# Patient Record
Sex: MALE | Race: WHITE | Employment: OTHER | URBAN - METROPOLITAN AREA
[De-identification: names, ages, dates, MRNs, and addresses within clinical notes are randomized per-mention and may not be internally consistent; named-entity substitution may affect disease eponyms.]

---

## 2017-02-07 ENCOUNTER — HOSPITAL ENCOUNTER (OUTPATIENT)
Dept: LAB | Age: 77
Discharge: HOME | End: 2017-02-07

## 2017-12-01 ENCOUNTER — HOSPITAL ENCOUNTER (OUTPATIENT)
Dept: LAB | Age: 77
Discharge: HOME | End: 2017-12-01

## 2018-12-03 ENCOUNTER — HOSPITAL ENCOUNTER (OUTPATIENT)
Dept: LAB | Age: 78
Discharge: HOME | End: 2018-12-03

## 2020-02-21 ENCOUNTER — HOSPITAL ENCOUNTER (OUTPATIENT)
Dept: GENERAL RADIOLOGY | Age: 80
Discharge: HOME | End: 2020-02-21
Attending: ORTHOPAEDIC SURGERY
Payer: MEDICARE

## 2020-02-21 ENCOUNTER — HOSPITAL ENCOUNTER (OUTPATIENT)
Dept: MRI IMAGING | Age: 80
Discharge: HOME | End: 2020-02-21
Attending: ORTHOPAEDIC SURGERY
Payer: MEDICARE

## 2020-02-21 DIAGNOSIS — M54.16 LUMBAR RADICULOPATHY: ICD-10-CM

## 2020-02-21 PROCEDURE — 70030 X-RAY EYE FOR FOREIGN BODY: CPT

## 2020-02-21 PROCEDURE — 72148 MRI LUMBAR SPINE W/O DYE: CPT

## 2024-12-31 ENCOUNTER — TELEPHONE (OUTPATIENT)
Age: 84
End: 2024-12-31

## 2024-12-31 DIAGNOSIS — R94.39 ABNORMAL STRESS TEST: Primary | ICD-10-CM

## 2024-12-31 DIAGNOSIS — I25.10 ATHEROSCLEROSIS OF NATIVE CORONARY ARTERY, UNSPECIFIED WHETHER ANGINA PRESENT, UNSPECIFIED WHETHER NATIVE OR TRANSPLANTED HEART: ICD-10-CM

## 2024-12-31 NOTE — TELEPHONE ENCOUNTER
Spoke with Pts spouse regarding [facility] schd. For 1/21/2025 At 1:00 PM arrive at 11:30 AM. Pt aware that they need a  they must stay on site NPO from Midnight the night before. You can have clear liquids up to 2 hours prior to procedure. Please drink 10 8oz glasses of water 3 days prior and 3 days after Cath. Check in at the second floor Outpt. Reg. Desk. [facility] 1[facility]14/[facility].     Medications:  Okay to take as normal     VO by /nurse: Cande

## 2025-01-21 ENCOUNTER — APPOINTMENT (OUTPATIENT)
Facility: HOSPITAL | Age: 85
DRG: 323 | End: 2025-01-21
Attending: INTERNAL MEDICINE
Payer: MEDICARE

## 2025-01-21 ENCOUNTER — HOSPITAL ENCOUNTER (INPATIENT)
Facility: HOSPITAL | Age: 85
LOS: 2 days | Discharge: HOME HEALTH | DRG: 323 | End: 2025-01-24
Attending: INTERNAL MEDICINE | Admitting: INTERNAL MEDICINE
Payer: MEDICARE

## 2025-01-21 DIAGNOSIS — R94.39 ABNORMAL STRESS TEST: ICD-10-CM

## 2025-01-21 DIAGNOSIS — I21.4 NSTEMI (NON-ST ELEVATED MYOCARDIAL INFARCTION) (HCC): ICD-10-CM

## 2025-01-21 DIAGNOSIS — I25.10 CAD (CORONARY ARTERY DISEASE): Primary | ICD-10-CM

## 2025-01-21 DIAGNOSIS — I72.9 PSEUDOANEURYSM (HCC): ICD-10-CM

## 2025-01-21 DIAGNOSIS — I25.10 CORONARY ARTERY DISEASE: ICD-10-CM

## 2025-01-21 LAB
ACT BLD: 239 SECS (ref 79–138)
ACT BLD: 273 SECS (ref 79–138)
ACT BLD: 308 SECS (ref 79–138)
ACT BLD: 314 SECS (ref 79–138)
BASOPHILS # BLD: 0.04 K/UL (ref 0–0.1)
BASOPHILS NFR BLD: 0.2 % (ref 0–1)
DIFFERENTIAL METHOD BLD: ABNORMAL
ECHO BSA: 2.35 M2
ECHO BSA: 2.35 M2
ECHO LV EF PHYSICIAN: 50 %
EKG ATRIAL RATE: 48 BPM
EKG DIAGNOSIS: NORMAL
EKG Q-T INTERVAL: 442 MS
EKG QRS DURATION: 144 MS
EKG QTC CALCULATION (BAZETT): 394 MS
EKG R AXIS: -74 DEGREES
EKG T AXIS: 26 DEGREES
EKG VENTRICULAR RATE: 48 BPM
EOSINOPHIL # BLD: 0.01 K/UL (ref 0–0.4)
EOSINOPHIL NFR BLD: 0.1 % (ref 0–7)
ERYTHROCYTE [DISTWIDTH] IN BLOOD BY AUTOMATED COUNT: 12.6 % (ref 11.5–14.5)
HCT VFR BLD AUTO: 37.2 % (ref 36.6–50.3)
HGB BLD-MCNC: 12.6 G/DL (ref 12.1–17)
IMM GRANULOCYTES # BLD AUTO: 0.05 K/UL (ref 0–0.04)
IMM GRANULOCYTES NFR BLD AUTO: 0.3 % (ref 0–0.5)
LYMPHOCYTES # BLD: 1.17 K/UL (ref 0.8–3.5)
LYMPHOCYTES NFR BLD: 7 % (ref 12–49)
MCH RBC QN AUTO: 34.4 PG (ref 26–34)
MCHC RBC AUTO-ENTMCNC: 33.9 G/DL (ref 30–36.5)
MCV RBC AUTO: 101.6 FL (ref 80–99)
MONOCYTES # BLD: 1.01 K/UL (ref 0–1)
MONOCYTES NFR BLD: 6.1 % (ref 5–13)
NEUTS SEG # BLD: 14.36 K/UL (ref 1.8–8)
NEUTS SEG NFR BLD: 86.3 % (ref 32–75)
NRBC # BLD: 0 K/UL (ref 0–0.01)
NRBC BLD-RTO: 0 PER 100 WBC
PLATELET # BLD AUTO: 366 K/UL (ref 150–400)
PMV BLD AUTO: 9.7 FL (ref 8.9–12.9)
RBC # BLD AUTO: 3.66 M/UL (ref 4.1–5.7)
WBC # BLD AUTO: 16.6 K/UL (ref 4.1–11.1)

## 2025-01-21 PROCEDURE — 02F03ZZ FRAGMENTATION IN CORONARY ARTERY, ONE ARTERY, PERCUTANEOUS APPROACH: ICD-10-PCS | Performed by: INTERNAL MEDICINE

## 2025-01-21 PROCEDURE — 6360000004 HC RX CONTRAST MEDICATION: Performed by: INTERNAL MEDICINE

## 2025-01-21 PROCEDURE — 2580000003 HC RX 258: Performed by: INTERNAL MEDICINE

## 2025-01-21 PROCEDURE — 93308 TTE F-UP OR LMTD: CPT

## 2025-01-21 PROCEDURE — G0378 HOSPITAL OBSERVATION PER HR: HCPCS

## 2025-01-21 PROCEDURE — 6370000000 HC RX 637 (ALT 250 FOR IP): Performed by: INTERNAL MEDICINE

## 2025-01-21 PROCEDURE — 2709999900 HC NON-CHARGEABLE SUPPLY: Performed by: INTERNAL MEDICINE

## 2025-01-21 PROCEDURE — 2500000003 HC RX 250 WO HCPCS: Performed by: INTERNAL MEDICINE

## 2025-01-21 PROCEDURE — 99153 MOD SED SAME PHYS/QHP EA: CPT | Performed by: INTERNAL MEDICINE

## 2025-01-21 PROCEDURE — B2111ZZ FLUOROSCOPY OF MULTIPLE CORONARY ARTERIES USING LOW OSMOLAR CONTRAST: ICD-10-PCS | Performed by: INTERNAL MEDICINE

## 2025-01-21 PROCEDURE — C1725 CATH, TRANSLUMIN NON-LASER: HCPCS | Performed by: INTERNAL MEDICINE

## 2025-01-21 PROCEDURE — 93005 ELECTROCARDIOGRAM TRACING: CPT | Performed by: INTERNAL MEDICINE

## 2025-01-21 PROCEDURE — 80048 BASIC METABOLIC PNL TOTAL CA: CPT

## 2025-01-21 PROCEDURE — C9602 PERC D-E COR STENT ATHER S: HCPCS | Performed by: INTERNAL MEDICINE

## 2025-01-21 PROCEDURE — 027034Z DILATION OF CORONARY ARTERY, ONE ARTERY WITH DRUG-ELUTING INTRALUMINAL DEVICE, PERCUTANEOUS APPROACH: ICD-10-PCS | Performed by: INTERNAL MEDICINE

## 2025-01-21 PROCEDURE — 4A023N7 MEASUREMENT OF CARDIAC SAMPLING AND PRESSURE, LEFT HEART, PERCUTANEOUS APPROACH: ICD-10-PCS | Performed by: INTERNAL MEDICINE

## 2025-01-21 PROCEDURE — 2720000010 HC SURG SUPPLY STERILE: Performed by: INTERNAL MEDICINE

## 2025-01-21 PROCEDURE — 3E033PZ INTRODUCTION OF PLATELET INHIBITOR INTO PERIPHERAL VEIN, PERCUTANEOUS APPROACH: ICD-10-PCS | Performed by: INTERNAL MEDICINE

## 2025-01-21 PROCEDURE — 36415 COLL VENOUS BLD VENIPUNCTURE: CPT

## 2025-01-21 PROCEDURE — 92973 PRQ TRLUML C MCHN ASP THRMBC: CPT | Performed by: INTERNAL MEDICINE

## 2025-01-21 PROCEDURE — B2151ZZ FLUOROSCOPY OF LEFT HEART USING LOW OSMOLAR CONTRAST: ICD-10-PCS | Performed by: INTERNAL MEDICINE

## 2025-01-21 PROCEDURE — 92972 PERQ TRLUML CORONRY LITHOTRP: CPT | Performed by: INTERNAL MEDICINE

## 2025-01-21 PROCEDURE — 85520 HEPARIN ASSAY: CPT

## 2025-01-21 PROCEDURE — 92978 ENDOLUMINL IVUS OCT C 1ST: CPT | Performed by: INTERNAL MEDICINE

## 2025-01-21 PROCEDURE — 85347 COAGULATION TIME ACTIVATED: CPT

## 2025-01-21 PROCEDURE — B240ZZ3 ULTRASONOGRAPHY OF SINGLE CORONARY ARTERY, INTRAVASCULAR: ICD-10-PCS | Performed by: INTERNAL MEDICINE

## 2025-01-21 PROCEDURE — 6360000002 HC RX W HCPCS: Performed by: INTERNAL MEDICINE

## 2025-01-21 PROCEDURE — 85730 THROMBOPLASTIN TIME PARTIAL: CPT

## 2025-01-21 PROCEDURE — 6360000002 HC RX W HCPCS: Performed by: PHYSICIAN ASSISTANT

## 2025-01-21 PROCEDURE — 93454 CORONARY ARTERY ANGIO S&I: CPT | Performed by: INTERNAL MEDICINE

## 2025-01-21 PROCEDURE — 85025 COMPLETE CBC W/AUTO DIFF WBC: CPT

## 2025-01-21 PROCEDURE — 93005 ELECTROCARDIOGRAM TRACING: CPT | Performed by: EMERGENCY MEDICINE

## 2025-01-21 PROCEDURE — C1753 CATH, INTRAVAS ULTRASOUND: HCPCS | Performed by: INTERNAL MEDICINE

## 2025-01-21 PROCEDURE — 93458 L HRT ARTERY/VENTRICLE ANGIO: CPT | Performed by: INTERNAL MEDICINE

## 2025-01-21 PROCEDURE — C1887 CATHETER, GUIDING: HCPCS | Performed by: INTERNAL MEDICINE

## 2025-01-21 PROCEDURE — 85610 PROTHROMBIN TIME: CPT

## 2025-01-21 PROCEDURE — C1894 INTRO/SHEATH, NON-LASER: HCPCS | Performed by: INTERNAL MEDICINE

## 2025-01-21 PROCEDURE — C1874 STENT, COATED/COV W/DEL SYS: HCPCS | Performed by: INTERNAL MEDICINE

## 2025-01-21 PROCEDURE — C1769 GUIDE WIRE: HCPCS | Performed by: INTERNAL MEDICINE

## 2025-01-21 PROCEDURE — C1761 HC CATH TRANSLUM INTRAVASCULAR LITHOTRIPSY CORONARY: HCPCS | Performed by: INTERNAL MEDICINE

## 2025-01-21 PROCEDURE — 99152 MOD SED SAME PHYS/QHP 5/>YRS: CPT | Performed by: INTERNAL MEDICINE

## 2025-01-21 PROCEDURE — 76937 US GUIDE VASCULAR ACCESS: CPT | Performed by: INTERNAL MEDICINE

## 2025-01-21 PROCEDURE — 93308 TTE F-UP OR LMTD: CPT | Performed by: INTERNAL MEDICINE

## 2025-01-21 PROCEDURE — 3E033XZ INTRODUCTION OF VASOPRESSOR INTO PERIPHERAL VEIN, PERCUTANEOUS APPROACH: ICD-10-PCS | Performed by: INTERNAL MEDICINE

## 2025-01-21 PROCEDURE — 02C03ZZ EXTIRPATION OF MATTER FROM CORONARY ARTERY, ONE ARTERY, PERCUTANEOUS APPROACH: ICD-10-PCS | Performed by: INTERNAL MEDICINE

## 2025-01-21 PROCEDURE — 93010 ELECTROCARDIOGRAM REPORT: CPT | Performed by: INTERNAL MEDICINE

## 2025-01-21 PROCEDURE — 85347 COAGULATION TIME ACTIVATED: CPT | Performed by: INTERNAL MEDICINE

## 2025-01-21 DEVICE — STENT ONYXNG30034UX ONYX 3.00X34RX
Type: IMPLANTABLE DEVICE | Status: FUNCTIONAL
Brand: ONYX FRONTIER™

## 2025-01-21 RX ORDER — MIDAZOLAM HYDROCHLORIDE 1 MG/ML
INJECTION, SOLUTION INTRAMUSCULAR; INTRAVENOUS PRN
Status: DISCONTINUED | OUTPATIENT
Start: 2025-01-21 | End: 2025-01-21 | Stop reason: HOSPADM

## 2025-01-21 RX ORDER — FENTANYL CITRATE 50 UG/ML
INJECTION, SOLUTION INTRAMUSCULAR; INTRAVENOUS PRN
Status: DISCONTINUED | OUTPATIENT
Start: 2025-01-21 | End: 2025-01-21 | Stop reason: HOSPADM

## 2025-01-21 RX ORDER — CLOPIDOGREL 300 MG/1
TABLET, FILM COATED ORAL PRN
Status: DISCONTINUED | OUTPATIENT
Start: 2025-01-21 | End: 2025-01-21 | Stop reason: HOSPADM

## 2025-01-21 RX ORDER — ACETAMINOPHEN 325 MG/1
650 TABLET ORAL EVERY 4 HOURS PRN
Status: DISCONTINUED | OUTPATIENT
Start: 2025-01-21 | End: 2025-01-21

## 2025-01-21 RX ORDER — ATORVASTATIN CALCIUM 20 MG/1
40 TABLET, FILM COATED ORAL NIGHTLY
Status: DISCONTINUED | OUTPATIENT
Start: 2025-01-22 | End: 2025-01-24 | Stop reason: HOSPADM

## 2025-01-21 RX ORDER — VERAPAMIL HYDROCHLORIDE 2.5 MG/ML
INJECTION, SOLUTION INTRAVENOUS PRN
Status: DISCONTINUED | OUTPATIENT
Start: 2025-01-21 | End: 2025-01-21 | Stop reason: HOSPADM

## 2025-01-21 RX ORDER — HEPARIN SODIUM 1000 [USP'U]/ML
INJECTION, SOLUTION INTRAVENOUS; SUBCUTANEOUS PRN
Status: DISCONTINUED | OUTPATIENT
Start: 2025-01-21 | End: 2025-01-21 | Stop reason: HOSPADM

## 2025-01-21 RX ORDER — LIDOCAINE HYDROCHLORIDE 10 MG/ML
INJECTION, SOLUTION INFILTRATION; PERINEURAL PRN
Status: DISCONTINUED | OUTPATIENT
Start: 2025-01-21 | End: 2025-01-21 | Stop reason: HOSPADM

## 2025-01-21 RX ORDER — PHENYLEPHRINE HCL IN 0.9% NACL 0.4MG/10ML
SYRINGE (ML) INTRAVENOUS PRN
Status: DISCONTINUED | OUTPATIENT
Start: 2025-01-21 | End: 2025-01-21 | Stop reason: HOSPADM

## 2025-01-21 RX ORDER — MELOXICAM 15 MG/1
15 TABLET ORAL DAILY
Status: ON HOLD | COMMUNITY
End: 2025-01-23 | Stop reason: HOSPADM

## 2025-01-21 RX ORDER — CLONAZEPAM 0.5 MG/1
0.5 TABLET ORAL 2 TIMES DAILY PRN
COMMUNITY

## 2025-01-21 RX ORDER — ACETAMINOPHEN 325 MG/1
650 TABLET ORAL EVERY 4 HOURS PRN
Status: DISCONTINUED | OUTPATIENT
Start: 2025-01-21 | End: 2025-01-24 | Stop reason: HOSPADM

## 2025-01-21 RX ORDER — HYDRALAZINE HYDROCHLORIDE 20 MG/ML
INJECTION INTRAMUSCULAR; INTRAVENOUS PRN
Status: DISCONTINUED | OUTPATIENT
Start: 2025-01-21 | End: 2025-01-21 | Stop reason: HOSPADM

## 2025-01-21 RX ORDER — SODIUM CHLORIDE 9 MG/ML
INJECTION, SOLUTION INTRAVENOUS CONTINUOUS
Status: DISCONTINUED | OUTPATIENT
Start: 2025-01-21 | End: 2025-01-21 | Stop reason: HOSPADM

## 2025-01-21 RX ORDER — ASPIRIN 325 MG
TABLET ORAL PRN
Status: DISCONTINUED | OUTPATIENT
Start: 2025-01-21 | End: 2025-01-21 | Stop reason: HOSPADM

## 2025-01-21 RX ORDER — ASPIRIN 81 MG/1
81 TABLET, CHEWABLE ORAL DAILY
Status: DISCONTINUED | OUTPATIENT
Start: 2025-01-22 | End: 2025-01-22

## 2025-01-21 RX ORDER — ATORVASTATIN CALCIUM 40 MG/1
40 TABLET, FILM COATED ORAL DAILY
COMMUNITY

## 2025-01-21 RX ORDER — CLOPIDOGREL BISULFATE 75 MG/1
75 TABLET ORAL DAILY
Status: DISCONTINUED | OUTPATIENT
Start: 2025-01-22 | End: 2025-01-24 | Stop reason: HOSPADM

## 2025-01-21 RX ORDER — HEPARIN SODIUM 200 [USP'U]/100ML
INJECTION, SOLUTION INTRAVENOUS PRN
Status: DISCONTINUED | OUTPATIENT
Start: 2025-01-21 | End: 2025-01-21 | Stop reason: HOSPADM

## 2025-01-21 RX ORDER — HEPARIN SODIUM 1000 [USP'U]/ML
4000 INJECTION, SOLUTION INTRAVENOUS; SUBCUTANEOUS PRN
Status: DISCONTINUED | OUTPATIENT
Start: 2025-01-22 | End: 2025-01-22

## 2025-01-21 RX ORDER — EPTIFIBATIDE 0.75 MG/ML
INJECTION, SOLUTION INTRAVENOUS CONTINUOUS PRN
Status: COMPLETED | OUTPATIENT
Start: 2025-01-21 | End: 2025-01-21

## 2025-01-21 RX ORDER — IOPAMIDOL 755 MG/ML
INJECTION, SOLUTION INTRAVASCULAR PRN
Status: DISCONTINUED | OUTPATIENT
Start: 2025-01-21 | End: 2025-01-21 | Stop reason: HOSPADM

## 2025-01-21 RX ORDER — AMOXAPINE 50 MG/1
50 TABLET ORAL 2 TIMES DAILY
COMMUNITY

## 2025-01-21 RX ORDER — EPTIFIBATIDE 0.75 MG/ML
2 INJECTION, SOLUTION INTRAVENOUS CONTINUOUS
Status: DISPENSED | OUTPATIENT
Start: 2025-01-21 | End: 2025-01-22

## 2025-01-21 RX ORDER — ONDANSETRON 2 MG/ML
4 INJECTION INTRAMUSCULAR; INTRAVENOUS EVERY 6 HOURS PRN
Status: DISCONTINUED | OUTPATIENT
Start: 2025-01-21 | End: 2025-01-24 | Stop reason: HOSPADM

## 2025-01-21 RX ORDER — VITAMIN B COMPLEX
1 CAPSULE ORAL DAILY
COMMUNITY

## 2025-01-21 RX ORDER — SODIUM CHLORIDE 9 MG/ML
INJECTION, SOLUTION INTRAVENOUS CONTINUOUS
OUTPATIENT
Start: 2025-01-21 | End: 2025-01-22

## 2025-01-21 RX ORDER — NOREPINEPHRINE BITARTRATE 0.06 MG/ML
1-100 INJECTION, SOLUTION INTRAVENOUS CONTINUOUS
Status: DISCONTINUED | OUTPATIENT
Start: 2025-01-21 | End: 2025-01-22

## 2025-01-21 RX ORDER — HEPARIN SODIUM 10000 [USP'U]/100ML
5-30 INJECTION, SOLUTION INTRAVENOUS CONTINUOUS
Status: DISCONTINUED | OUTPATIENT
Start: 2025-01-21 | End: 2025-01-22

## 2025-01-21 RX ORDER — HEPARIN SODIUM 1000 [USP'U]/ML
2000 INJECTION, SOLUTION INTRAVENOUS; SUBCUTANEOUS PRN
Status: DISCONTINUED | OUTPATIENT
Start: 2025-01-22 | End: 2025-01-22

## 2025-01-21 RX ADMIN — NOREPINEPHRINE BITARTRATE 10 MCG/MIN: 64 SOLUTION INTRAVENOUS at 18:31

## 2025-01-21 RX ADMIN — ONDANSETRON 4 MG: 2 INJECTION, SOLUTION INTRAMUSCULAR; INTRAVENOUS at 23:24

## 2025-01-21 RX ADMIN — HEPARIN SODIUM 12 UNITS/KG/HR: 10000 INJECTION, SOLUTION INTRAVENOUS at 18:09

## 2025-01-21 RX ADMIN — ACETAMINOPHEN 650 MG: 325 TABLET ORAL at 18:16

## 2025-01-21 RX ADMIN — ACETAMINOPHEN 650 MG: 325 TABLET ORAL at 23:47

## 2025-01-21 RX ADMIN — EPTIFIBATIDE 2 MCG/KG/MIN: 0.75 INJECTION INTRAVENOUS at 18:07

## 2025-01-21 ASSESSMENT — PAIN DESCRIPTION - LOCATION
LOCATION: HIP
LOCATION: HIP

## 2025-01-21 ASSESSMENT — PAIN SCALES - GENERAL
PAINLEVEL_OUTOF10: 4
PAINLEVEL_OUTOF10: 10
PAINLEVEL_OUTOF10: 3

## 2025-01-21 ASSESSMENT — PAIN DESCRIPTION - ORIENTATION: ORIENTATION: LEFT

## 2025-01-21 NOTE — H&P
Pt personally seen and examined. Chart reviewed.    H and P as per office note 12/18/24        CVS-S1-S2 present, no murmur    2/6 systolic murmur present  RS-   CTAB        Abdomen-  soft/NT  LE-   no edema    A/P:    Abnormal Stress nuc study - [facility] +/- PCI/RHC consent    The risks (including but not limited to death, myocardial infarction, cerebrovascular accident, dysrhythmia, renal failure +/-dialysis, vascular complication, allergy, and/or need for emergency surgery), indications, benefits, and alternatives of cardiac catheterization +/- PCI have been explained in detail to the patient and family.  Patient and family informed that if patient needs surgery  - it will be at [facility] as [facility] does not have onsite surgery.  All questions answered to patient's satisfaction. Patient agrees to proceed with the procedure and  informed consent was obtained.    ASA 2    AW 2    Discussed with patient/nursing/family    Jesse Lopez. MD, FACC

## 2025-01-21 NOTE — PROCEDURES
BRIEF PROCEDURE NOTE    Date of Procedure: 1/21/2025   Preoperative Diagnosis: S/p PCI to LAD ; Post PCI - CP/EKG changes  Postoperative Diagnosis: S/p mechanical aspiration thrombectomy of LAD; RCA not engaged    Procedure: Left heart cath, LV angiography, coronary angiography  Interventional Cardiologist: Jesse Lopez MD  Assistant : none  Anesthesia: local + IV sedation  I administered moderate sedation throughout this procedure. An independent trained observer pushed medications at my direction, and monitored the patient’s level of consciousness and physiological status throughout.  Estimated Blood Loss: Minimal    Access:   R CFA - Ultrasound/Fluoroscopic guided micropuncture access - 6 F sheath    Catheters: RCA : not engaged                    LCA : EBU 3.75 guide    Findings:     L Main: Large; MLI     LAD: Ostial LAD 50%; Prox/Mid LAD - stent patent but LEONILA 2 flow apcial LAD;  D1 - small ; Ostium/prox 60%; LEONILA 2 flow;  D2 - not visualized       LCflex:Large; Dominant; MLI; OM1/OM2 - large; MLI    RCA: not engaged    LVEDP: Valve not crossed    PCI: EBU 3.75 guide  LAD wired with xin blue  Mechanical aspiration thrombectomy performed - [employer] Cat 5 Rx catheter - 1 pass;  Heparin gtt/Integrilin bolus + gtt  On levophed gtt for hypotension  Post aspiration thrombectomy - LEONILA 3 flow in LAD  D2 - vessel - flow present - ostium/prox - haziness present  Attempted wired D2 vessel - significant ectopy/NSVT - Amiodarone bolus 150mg given    With improved flow in LAD and flow established in D2; pt''s CP resolved.  12 Lead EKG - SR/ NO ST elevation noted/ NSST  Bedside limited echo - no Pericardial effusion/LVEF 50-55%        Specimens Removed : None    Devices implanted : None    Complications: Hypotension    Closure Device: R CFA - sheath sutured in situ        Rec: Wean off levophed gtt  Continue Integrilin/Heparin gtt overnight.   Keep TR band on and R CFA sheath in situ while on heparin/Integrilin gtt.

## 2025-01-21 NOTE — PROCEDURES
BRIEF PROCEDURE NOTE    Date of Procedure: 1/21/2025   Preoperative Diagnosis:  Abnormal stress test  Postoperative Diagnosis: L Dominant; RCA/Dominant L Cflex - MLI; Ostial LAD 50%; Mid LAD 90% ; D1/D2 - small to med; ostial mod dz; PCI to mid LAD - IVL shockwave /ROSSANA    Procedure: Left heart cath, LV angiography, coronary angiography  Interventional Cardiologist: Jesse Lopez MD  Assistant : none  Anesthesia: local + IV sedation  I administered moderate sedation throughout this procedure. An independent trained observer pushed medications at my direction, and monitored the patient’s level of consciousness and physiological status throughout.  Estimated Blood Loss: Minimal    Access: R Radial Access - 6 F sheath        Catheters: RCA :JR4                    LCA : JL4    Findings:     L Dominant  Cor Ca+++    L Main: Large; MLI    LAD: Ostial LAD 50%; Mid LAD - diffuse/at D1/D2 - 80-90%; D1 - small ; Ostium/prox 60%; D2 - small to med; ostial/prox - 60%; Septal branches - small/ mod diffuse dz    LCflex:Large; Dominant; MLI; OM1/OM2 - large; MLI    RCA: Non Dominant; small ; MLI    LVEDP: 13 mm Hg    LVEF: Not assessed    No significant gradient across aortic valve.    PCI: EBU 3.5 guide  LAD wired with Kelvin blue  D2 wired with Run through  IVUS would not cross lesion; Ca +++; Ostial/prox LAD ; < 180 deg calcium ; approx 40-50% lesion;   PTCA with 2.25 by 15 balloon  IVL shockwave - 3.0 - crossed lesion ; 5 times10 pulses delivered distal to proximal  Post dil with balloon  ROSSANA 3 by 24   Post dil with 3 by 20 NC balloon  D1/D2 - ostium pinched/hazy (prob from katina+) - LEONILA 3 flow; LAD LEONILA 3 flow        Specimens Removed : None    Devices implanted : ROSSANA    Complications: None    Closure Device: R Radial - TR band        See full cath note.    Complications: none    Jesse Lopez MD

## 2025-01-22 ENCOUNTER — APPOINTMENT (OUTPATIENT)
Facility: HOSPITAL | Age: 85
DRG: 323 | End: 2025-01-22
Attending: INTERNAL MEDICINE
Payer: MEDICARE

## 2025-01-22 PROBLEM — T82.867A CORONARY STENT THROMBOSIS: Status: ACTIVE | Noted: 2025-01-22

## 2025-01-22 PROBLEM — I25.10 CAD IN NATIVE ARTERY: Status: ACTIVE | Noted: 2025-01-22

## 2025-01-22 LAB
ALBUMIN SERPL-MCNC: 2.8 G/DL (ref 3.5–5)
ALBUMIN/GLOB SERPL: 0.7 (ref 1.1–2.2)
ALP SERPL-CCNC: 133 U/L (ref 45–117)
ALT SERPL-CCNC: 31 U/L (ref 12–78)
ANION GAP SERPL CALC-SCNC: 1 MMOL/L (ref 2–12)
ANION GAP SERPL CALC-SCNC: 2 MMOL/L (ref 2–12)
ANION GAP SERPL CALC-SCNC: 3 MMOL/L (ref 2–12)
APTT PPP: >130 SEC (ref 22.1–31)
AST SERPL-CCNC: 49 U/L (ref 15–37)
BASOPHILS # BLD: 0.04 K/UL (ref 0–0.1)
BASOPHILS NFR BLD: 0.3 % (ref 0–1)
BILIRUB SERPL-MCNC: 1 MG/DL (ref 0.2–1)
BUN SERPL-MCNC: 29 MG/DL (ref 6–20)
BUN SERPL-MCNC: 29 MG/DL (ref 6–20)
BUN SERPL-MCNC: 34 MG/DL (ref 6–20)
BUN/CREAT SERPL: 25 (ref 12–20)
BUN/CREAT SERPL: 26 (ref 12–20)
BUN/CREAT SERPL: 29 (ref 12–20)
CALCIUM SERPL-MCNC: 8.3 MG/DL (ref 8.5–10.1)
CALCIUM SERPL-MCNC: 8.4 MG/DL (ref 8.5–10.1)
CALCIUM SERPL-MCNC: 8.6 MG/DL (ref 8.5–10.1)
CHLORIDE SERPL-SCNC: 108 MMOL/L (ref 97–108)
CHLORIDE SERPL-SCNC: 109 MMOL/L (ref 97–108)
CHLORIDE SERPL-SCNC: 109 MMOL/L (ref 97–108)
CO2 SERPL-SCNC: 25 MMOL/L (ref 21–32)
CO2 SERPL-SCNC: 25 MMOL/L (ref 21–32)
CO2 SERPL-SCNC: 26 MMOL/L (ref 21–32)
CREAT SERPL-MCNC: 1.12 MG/DL (ref 0.7–1.3)
CREAT SERPL-MCNC: 1.14 MG/DL (ref 0.7–1.3)
CREAT SERPL-MCNC: 1.17 MG/DL (ref 0.7–1.3)
DIFFERENTIAL METHOD BLD: ABNORMAL
EOSINOPHIL # BLD: 0.01 K/UL (ref 0–0.4)
EOSINOPHIL NFR BLD: 0.1 % (ref 0–7)
ERYTHROCYTE [DISTWIDTH] IN BLOOD BY AUTOMATED COUNT: 12.7 % (ref 11.5–14.5)
GLOBULIN SER CALC-MCNC: 3.8 G/DL (ref 2–4)
GLUCOSE SERPL-MCNC: 118 MG/DL (ref 65–100)
GLUCOSE SERPL-MCNC: 143 MG/DL (ref 65–100)
GLUCOSE SERPL-MCNC: 150 MG/DL (ref 65–100)
HCT VFR BLD AUTO: 36.1 % (ref 36.6–50.3)
HGB BLD-MCNC: 12.3 G/DL (ref 12.1–17)
IMM GRANULOCYTES # BLD AUTO: 0.05 K/UL (ref 0–0.04)
IMM GRANULOCYTES NFR BLD AUTO: 0.4 % (ref 0–0.5)
INR PPP: 1.1 (ref 0.9–1.1)
LYMPHOCYTES # BLD: 2.34 K/UL (ref 0.8–3.5)
LYMPHOCYTES NFR BLD: 17.3 % (ref 12–49)
MCH RBC QN AUTO: 35.2 PG (ref 26–34)
MCHC RBC AUTO-ENTMCNC: 34.1 G/DL (ref 30–36.5)
MCV RBC AUTO: 103.4 FL (ref 80–99)
MONOCYTES # BLD: 1.43 K/UL (ref 0–1)
MONOCYTES NFR BLD: 10.6 % (ref 5–13)
NEUTS SEG # BLD: 9.66 K/UL (ref 1.8–8)
NEUTS SEG NFR BLD: 71.3 % (ref 32–75)
NRBC # BLD: 0 K/UL (ref 0–0.01)
NRBC BLD-RTO: 0 PER 100 WBC
NT PRO BNP: 776 PG/ML
PLATELET # BLD AUTO: 338 K/UL (ref 150–400)
PMV BLD AUTO: 9.6 FL (ref 8.9–12.9)
POTASSIUM SERPL-SCNC: 4.5 MMOL/L (ref 3.5–5.1)
POTASSIUM SERPL-SCNC: 4.7 MMOL/L (ref 3.5–5.1)
POTASSIUM SERPL-SCNC: 6.4 MMOL/L (ref 3.5–5.1)
POTASSIUM SERPL-SCNC: ABNORMAL MMOL/L (ref 3.5–5.1)
PROT SERPL-MCNC: 6.6 G/DL (ref 6.4–8.2)
PROTHROMBIN TIME: 11.9 SEC (ref 9.2–11.2)
RBC # BLD AUTO: 3.49 M/UL (ref 4.1–5.7)
SODIUM SERPL-SCNC: 135 MMOL/L (ref 136–145)
SODIUM SERPL-SCNC: 136 MMOL/L (ref 136–145)
SODIUM SERPL-SCNC: 137 MMOL/L (ref 136–145)
THERAPEUTIC RANGE: ABNORMAL SECS (ref 58–77)
UFH PPP CHRO-ACNC: >1.5 IU/ML
WBC # BLD AUTO: 13.5 K/UL (ref 4.1–11.1)

## 2025-01-22 PROCEDURE — 2060000000 HC ICU INTERMEDIATE R&B

## 2025-01-22 PROCEDURE — 6360000004 HC RX CONTRAST MEDICATION: Performed by: INTERNAL MEDICINE

## 2025-01-22 PROCEDURE — 36415 COLL VENOUS BLD VENIPUNCTURE: CPT

## 2025-01-22 PROCEDURE — APPSS45 APP SPLIT SHARED TIME 31-45 MINUTES: Performed by: NURSE PRACTITIONER

## 2025-01-22 PROCEDURE — 6370000000 HC RX 637 (ALT 250 FOR IP): Performed by: PHYSICIAN ASSISTANT

## 2025-01-22 PROCEDURE — 83880 ASSAY OF NATRIURETIC PEPTIDE: CPT

## 2025-01-22 PROCEDURE — 94761 N-INVAS EAR/PLS OXIMETRY MLT: CPT

## 2025-01-22 PROCEDURE — 85025 COMPLETE CBC W/AUTO DIFF WBC: CPT

## 2025-01-22 PROCEDURE — 6370000000 HC RX 637 (ALT 250 FOR IP): Performed by: INTERNAL MEDICINE

## 2025-01-22 PROCEDURE — 80053 COMPREHEN METABOLIC PANEL: CPT

## 2025-01-22 PROCEDURE — 70470 CT HEAD/BRAIN W/O & W/DYE: CPT

## 2025-01-22 PROCEDURE — 6360000002 HC RX W HCPCS: Performed by: INTERNAL MEDICINE

## 2025-01-22 PROCEDURE — 84132 ASSAY OF SERUM POTASSIUM: CPT

## 2025-01-22 RX ORDER — CLONAZEPAM 0.5 MG/1
0.5 TABLET ORAL EVERY 12 HOURS
Status: DISCONTINUED | OUTPATIENT
Start: 2025-01-22 | End: 2025-01-24 | Stop reason: HOSPADM

## 2025-01-22 RX ORDER — AMOXAPINE 50 MG/1
50 TABLET ORAL NIGHTLY
Status: DISCONTINUED | OUTPATIENT
Start: 2025-01-22 | End: 2025-01-24 | Stop reason: HOSPADM

## 2025-01-22 RX ORDER — ASPIRIN 81 MG/1
81 TABLET ORAL DAILY
Status: DISCONTINUED | OUTPATIENT
Start: 2025-01-23 | End: 2025-01-23

## 2025-01-22 RX ORDER — IOPAMIDOL 755 MG/ML
100 INJECTION, SOLUTION INTRAVASCULAR
Status: COMPLETED | OUTPATIENT
Start: 2025-01-22 | End: 2025-01-22

## 2025-01-22 RX ADMIN — CLONAZEPAM 0.5 MG: 0.5 TABLET ORAL at 21:40

## 2025-01-22 RX ADMIN — ATORVASTATIN CALCIUM 40 MG: 20 TABLET, FILM COATED ORAL at 21:39

## 2025-01-22 RX ADMIN — FLUOXETINE HYDROCHLORIDE 20 MG: 20 CAPSULE ORAL at 10:36

## 2025-01-22 RX ADMIN — CLOPIDOGREL BISULFATE 75 MG: 75 TABLET ORAL at 10:36

## 2025-01-22 RX ADMIN — IOPAMIDOL 100 ML: 755 INJECTION, SOLUTION INTRAVENOUS at 17:41

## 2025-01-22 RX ADMIN — ASPIRIN 81 MG: 81 TABLET, CHEWABLE ORAL at 10:36

## 2025-01-22 RX ADMIN — EPTIFIBATIDE 2 MCG/KG/MIN: 0.75 INJECTION INTRAVENOUS at 00:17

## 2025-01-22 ASSESSMENT — PAIN SCALES - GENERAL
PAINLEVEL_OUTOF10: 0
PAINLEVEL_OUTOF10: 2
PAINLEVEL_OUTOF10: 0

## 2025-01-22 NOTE — CONSULTS
CRITICAL CARE  CONSULT NOTE      Name: Brett Acuna   : 1940   MRN: [id number]   Date: 2025      REASON FOR CONSULT: Shock, Post [facility]     ASSESSMENT & PLAN     Shock   Transient, in setting of acute MI with rapid resolution post repeat PCI/thrombectomy   - Briefly on levophed gtt, weaned off upon arrival to ICU   - MAP goal >65    CAD s/p PCI   - s/p ROSSANA of mid LAD and D2 thrombectomy on   - TR band and sheath to remain in place until AM per cardiology   - Continue Integrilin and heparin   - Start DAPT tomorrow  - Remainder per Cardiology     A Fib/Flutter   - History of cardioversion 2024, ablation 2024   - Currently in NSR  - Home warfarin held while on heparin, Integrilin   - Continue tele    HLD  - Home atorvastatin    Anxiety   - Restart home fluoxetine    SUBJECTIVE   Brett Acuna is an 84yoM with Carotid Artery Stenosis, CAD, HLD, HTN, AF/flutter (on AC), 2nd Degree AV Block, Anxiety, who presented for scheduled left heart catheterization with Dr. Lopez. Catheterization revealed Mid LAD occlusion s/p ROSSANA with LEONILA 3 flow. While in recovery, patient developed severe chest pain with bradycardia and hypotension requiring levophed. EKG revealed anterolateral STEMI with reciprocal changes. He was emergently taken back to cath lab and found to have reduced flow of LAD stent with occlusion of D2 branch requiring thrombectomy. With improved flow in LAD and diagonal branches, patient chest pain resolved, EKG without ST elevation. Patient taken to ICU for post procedural observation.     Upon ICU assessment, patient is off levophed, vital signs stable. He complains of mild nausea, but denies chest pain, dyspnea, lightheadedness, dyspnea.      OBJECTIVE   /75   Pulse 82   Temp 98 °F (36.7 °C) (Oral)   Resp 13   Ht 1.88 m (6' 2\")   Wt 106.1 kg (234 lb)   SpO2 95%   BMI 30.04 kg/m²      Temp (24hrs), Av.2 °F (36.8 °C), Min:98 °F (36.7 °C), Max:98.7 °F (37.1 °C)

## 2025-01-22 NOTE — PLAN OF CARE
Problem: Pain  Goal: Verbalizes/displays adequate comfort level or baseline comfort level  1/22/2025 0603 by Socorro Wolf, RN  Outcome: Progressing  1/21/2025 1756 by Mitch Herrera, RN  Outcome: Progressing     Problem: Discharge Planning  Goal: Discharge to home or other facility with appropriate resources  1/21/2025 1756 by Mitch Herrera, RN  Outcome: Progressing     Problem: Safety - Adult  Goal: Free from fall injury  Outcome: Progressing

## 2025-01-22 NOTE — PLAN OF CARE
Problem: Pain  Goal: Verbalizes/displays adequate comfort level or baseline comfort level  1/22/2025 0603 by Socorro Wolf, RN  Outcome: Progressing     Problem: Discharge Planning  Goal: Discharge to home or other facility with appropriate resources  Outcome: Progressing     Problem: Safety - Adult  Goal: Free from fall injury  1/22/2025 1338 by Mitch Herrera, RN  Outcome: Progressing  1/22/2025 0603 by Socorro Wolf, RN  Outcome: Progressing

## 2025-01-23 ENCOUNTER — APPOINTMENT (OUTPATIENT)
Facility: HOSPITAL | Age: 85
DRG: 323 | End: 2025-01-23
Attending: INTERNAL MEDICINE
Payer: MEDICARE

## 2025-01-23 ENCOUNTER — APPOINTMENT (OUTPATIENT)
Dept: VASCULAR SURGERY | Facility: HOSPITAL | Age: 85
DRG: 323 | End: 2025-01-23
Attending: INTERNAL MEDICINE
Payer: MEDICARE

## 2025-01-23 DIAGNOSIS — Z95.5 STENTED CORONARY ARTERY: Primary | ICD-10-CM

## 2025-01-23 LAB
ANION GAP SERPL CALC-SCNC: 5 MMOL/L (ref 2–12)
BUN SERPL-MCNC: 41 MG/DL (ref 6–20)
BUN/CREAT SERPL: 38 (ref 12–20)
CALCIUM SERPL-MCNC: 9 MG/DL (ref 8.5–10.1)
CHLORIDE SERPL-SCNC: 109 MMOL/L (ref 97–108)
CO2 SERPL-SCNC: 25 MMOL/L (ref 21–32)
CREAT SERPL-MCNC: 1.07 MG/DL (ref 0.7–1.3)
ECHO BSA: 2.35 M2
ECHO BSA: 2.35 M2
EKG ATRIAL RATE: 52 BPM
EKG DIAGNOSIS: NORMAL
EKG DIAGNOSIS: NORMAL
EKG P AXIS: 62 DEGREES
EKG P-R INTERVAL: 376 MS
EKG Q-T INTERVAL: 378 MS
EKG Q-T INTERVAL: 442 MS
EKG QRS DURATION: 144 MS
EKG QRS DURATION: 92 MS
EKG QTC CALCULATION (BAZETT): 411 MS
EKG QTC CALCULATION (BAZETT): 487 MS
EKG R AXIS: -74 DEGREES
EKG R AXIS: 121 DEGREES
EKG T AXIS: -6 DEGREES
EKG T AXIS: 31 DEGREES
EKG VENTRICULAR RATE: 100 BPM
EKG VENTRICULAR RATE: 52 BPM
ERYTHROCYTE [DISTWIDTH] IN BLOOD BY AUTOMATED COUNT: 13 % (ref 11.5–14.5)
GLUCOSE SERPL-MCNC: 126 MG/DL (ref 65–100)
HCT VFR BLD AUTO: 32.4 % (ref 36.6–50.3)
HGB BLD-MCNC: 10.7 G/DL (ref 12.1–17)
MCH RBC QN AUTO: 34.6 PG (ref 26–34)
MCHC RBC AUTO-ENTMCNC: 33 G/DL (ref 30–36.5)
MCV RBC AUTO: 104.9 FL (ref 80–99)
NRBC # BLD: 0 K/UL (ref 0–0.01)
NRBC BLD-RTO: 0 PER 100 WBC
PLATELET # BLD AUTO: 324 K/UL (ref 150–400)
PMV BLD AUTO: 9.7 FL (ref 8.9–12.9)
POTASSIUM SERPL-SCNC: 4.7 MMOL/L (ref 3.5–5.1)
RBC # BLD AUTO: 3.09 M/UL (ref 4.1–5.7)
SODIUM SERPL-SCNC: 139 MMOL/L (ref 136–145)
VAS RIGHT ATA DIST PSV: 109.3 CM/S
VAS RIGHT CFA PROX PSV: 114.6 CM/S
VAS RIGHT PFA PROX PSV: 76 CM/S
VAS RIGHT POP A DIST PSV: 112 CM/S
VAS RIGHT POP A PROX PSV: 99.1 CM/S
VAS RIGHT POP A PROX VEL RATIO: 0.97
VAS RIGHT PSEUDOANEURYSM AP DIAM: 1.3 CM
VAS RIGHT PSEUDOANEURYSM LENGTH: 1.9 CM
VAS RIGHT PSEUDOANEURYSM LONG DIAM: 2.1 CM
VAS RIGHT PSEUDOANEURYSM TR DIAM: 1.8 CM
VAS RIGHT PSEUDOANEURYSM WIDTH: 0.44 CM
VAS RIGHT SFA DIST PSV: 101.7 CM/S
VAS RIGHT SFA DIST VEL RATIO: 0.77
VAS RIGHT SFA MID PSV: 132.8 CM/S
VAS RIGHT SFA MID VEL RATIO: 0.9
VAS RIGHT SFA PROX PSV: 148.3 CM/S
VAS RIGHT SFA PROX VEL RATIO: 1.3
WBC # BLD AUTO: 13.4 K/UL (ref 4.1–11.1)

## 2025-01-23 PROCEDURE — 6370000000 HC RX 637 (ALT 250 FOR IP): Performed by: NURSE PRACTITIONER

## 2025-01-23 PROCEDURE — 36415 COLL VENOUS BLD VENIPUNCTURE: CPT

## 2025-01-23 PROCEDURE — 80048 BASIC METABOLIC PNL TOTAL CA: CPT

## 2025-01-23 PROCEDURE — 6360000002 HC RX W HCPCS: Performed by: STUDENT IN AN ORGANIZED HEALTH CARE EDUCATION/TRAINING PROGRAM

## 2025-01-23 PROCEDURE — 6370000000 HC RX 637 (ALT 250 FOR IP): Performed by: INTERNAL MEDICINE

## 2025-01-23 PROCEDURE — 85027 COMPLETE CBC AUTOMATED: CPT

## 2025-01-23 PROCEDURE — 36002 PSEUDOANEURYSM INJECTION TRT: CPT | Performed by: INTERNAL MEDICINE

## 2025-01-23 PROCEDURE — 2060000000 HC ICU INTERMEDIATE R&B

## 2025-01-23 PROCEDURE — 6370000000 HC RX 637 (ALT 250 FOR IP): Performed by: PHYSICIAN ASSISTANT

## 2025-01-23 PROCEDURE — 3E053GC INTRODUCTION OF OTHER THERAPEUTIC SUBSTANCE INTO PERIPHERAL ARTERY, PERCUTANEOUS APPROACH: ICD-10-PCS | Performed by: STUDENT IN AN ORGANIZED HEALTH CARE EDUCATION/TRAINING PROGRAM

## 2025-01-23 PROCEDURE — C1894 INTRO/SHEATH, NON-LASER: HCPCS | Performed by: STUDENT IN AN ORGANIZED HEALTH CARE EDUCATION/TRAINING PROGRAM

## 2025-01-23 PROCEDURE — 76937 US GUIDE VASCULAR ACCESS: CPT

## 2025-01-23 PROCEDURE — 2709999900 HC NON-CHARGEABLE SUPPLY: Performed by: STUDENT IN AN ORGANIZED HEALTH CARE EDUCATION/TRAINING PROGRAM

## 2025-01-23 PROCEDURE — 93926 LOWER EXTREMITY STUDY: CPT

## 2025-01-23 RX ORDER — FUROSEMIDE 40 MG/1
20 TABLET ORAL DAILY
Status: DISCONTINUED | OUTPATIENT
Start: 2025-01-23 | End: 2025-01-23

## 2025-01-23 RX ORDER — CLOPIDOGREL BISULFATE 75 MG/1
75 TABLET ORAL DAILY
Qty: 30 TABLET | Refills: 11 | Status: SHIPPED | OUTPATIENT
Start: 2025-01-23

## 2025-01-23 RX ORDER — LIDOCAINE HYDROCHLORIDE 10 MG/ML
INJECTION, SOLUTION EPIDURAL; INFILTRATION; INTRACAUDAL; PERINEURAL PRN
Status: COMPLETED | OUTPATIENT
Start: 2025-01-23 | End: 2025-01-23

## 2025-01-23 RX ADMIN — AMOXAPINE 50 MG: 50 TABLET ORAL at 00:10

## 2025-01-23 RX ADMIN — CLONAZEPAM 0.5 MG: 0.5 TABLET ORAL at 20:24

## 2025-01-23 RX ADMIN — APIXABAN 5 MG: 5 TABLET, FILM COATED ORAL at 09:01

## 2025-01-23 RX ADMIN — LIDOCAINE HYDROCHLORIDE 5 ML: 10 INJECTION, SOLUTION EPIDURAL; INFILTRATION; INTRACAUDAL; PERINEURAL at 15:22

## 2025-01-23 RX ADMIN — AMOXAPINE 50 MG: 50 TABLET ORAL at 20:27

## 2025-01-23 RX ADMIN — ATORVASTATIN CALCIUM 40 MG: 20 TABLET, FILM COATED ORAL at 20:22

## 2025-01-23 RX ADMIN — FLUOXETINE HYDROCHLORIDE 20 MG: 20 CAPSULE ORAL at 09:00

## 2025-01-23 RX ADMIN — ACETAMINOPHEN 650 MG: 325 TABLET ORAL at 11:29

## 2025-01-23 RX ADMIN — CLOPIDOGREL BISULFATE 75 MG: 75 TABLET ORAL at 09:01

## 2025-01-23 RX ADMIN — APIXABAN 5 MG: 5 TABLET, FILM COATED ORAL at 20:23

## 2025-01-23 RX ADMIN — CLONAZEPAM 0.5 MG: 0.5 TABLET ORAL at 09:01

## 2025-01-23 ASSESSMENT — PAIN SCALES - GENERAL
PAINLEVEL_OUTOF10: 0
PAINLEVEL_OUTOF10: 3
PAINLEVEL_OUTOF10: 0
PAINLEVEL_OUTOF10: 1
PAINLEVEL_OUTOF10: 0

## 2025-01-23 ASSESSMENT — PAIN DESCRIPTION - LOCATION: LOCATION: OTHER (COMMENT)

## 2025-01-23 ASSESSMENT — PAIN DESCRIPTION - ORIENTATION: ORIENTATION: LEFT

## 2025-01-23 ASSESSMENT — PAIN DESCRIPTION - DESCRIPTORS: DESCRIPTORS: ACHING

## 2025-01-23 NOTE — PROCEDURES
Interventional Radiology  Procedure Note        1/23/2025 3:59 PM    Patient: Brett Acuna     Informed consent obtained    Diagnosis: R CFA pseudoaneurysm    Procedure(s): R CFA pseudoaneurysm thrombin injection, US guided  Intact R DP pulse before and after injection    Estimated blood loss: less than 5cc    Anesthesia: local    Specimens removed:  none    Complications: None    Implants: None    Primary Physician: Alexx Lerma MD    Recommendations: q2h neurovascular checks in RLE; followup arterial duplex exam tomorrow morning to check for recanalization in the PSA    Full dictated report to follow    Alexx Lerma MD  Interventional Radiology  [facility]  3:59 PM, 1/23/2025

## 2025-01-23 NOTE — DISCHARGE SUMMARY
Cardiology Discharge Summary     Patient ID:       Brett Acuna  [id number]  84 y.o.  1940    Admit Date: 1/21/2025    Discharge Date: 1/24/2025     Admitting Physician: Jesse Lopez MD   PCP: Peter Solano MD    Discharge Physician: RAAD Alaniz NP/ Jesse Lopez MD FAC    Consults: pulmonary/intensive care                    Cardiac Rehab, Case management     Admission Diagnoses: Abnormal stress test [R94.39]  Coronary artery disease [I25.10]  CAD (coronary artery disease) [I25.10]  CAD in native artery [I25.10]    Discharge Diagnoses: Principal Problem:    CAD (coronary artery disease)  Active Problems:    Abnormal stress test    Coronary artery disease    NSTEMI (non-ST elevated myocardial infarction) (HCC)    CAD in native artery    Coronary stent thrombosis  Resolved Problems:    * No resolved hospital problems. *      Discharge Condition: Stable    Cardiology Procedures this Admission:  Left heart catheterization with PCI    Hospital Course:    Brett Acuna presented for cardiac catheterization due to abnormal stress test at Dr Rene . He had complex PCI to LAD. Post cath complicated by Stent thrombosis/closure of branch vessel - requiring repeat cath - mechanical asp thrombectomy with re-establishment of flow- GDMT ; required heparin and integrillin.       01/21/25    ECHO (TTE) LIMITED (PRN CONTRAST/BUBBLE/STRAIN/3D) 01/21/2025  5:49 PM (Final)    Interpretation Summary    Left Ventricle: Low normal left ventricular systolic function with a visually estimated EF of 50 - 55%. Normal wall motion.    Pericardium: The pericardium is normal. No pericardial effusion.    Image quality is fair.      Cardiac cath 01/21/25    CARDIAC PROCEDURE 01/21/2025  9:35 PM (Final)    Conclusion  Access:   R CFA - Ultrasound/Fluoroscopic guided micropuncture access - 6 F sheath    Catheters: RCA : not engaged  LCA : EBU 3.75 guide    Findings:    L Main: Large; MLI    LAD: Ostial LAD

## 2025-01-23 NOTE — CARDIO/PULMONARY
[facility]- Referral  Chart review completed.  Noted: PCI, post PCI STEMI with return to cath lab  Met with patient: in ICU, daughter also present  MI/PCI patient meets criteria for outpatient cardiac rehab.  [facility] outpatient cardiac rehab referral will be initiated.    Educational handouts reviewed and provided on: MI/ PCI procedure, coronary artery disease, coronary artery risk factors, heart healthy eating, and community resources.    The format and benefits of enrolling in a cardiac rehab Phase II program were communicated.   All questions answered.  Reference information on [facility] also provided.  [facility] staff will contact patIent, per telephone call, to assess and schedule program participation.  Pt and daughter communicated that patient's wife requires many medical/doctor appointments.  Pt stated he may not be able to enroll due to other family medical commitments.   CONSUELO Perez RN

## 2025-01-24 ENCOUNTER — APPOINTMENT (OUTPATIENT)
Dept: VASCULAR SURGERY | Facility: HOSPITAL | Age: 85
DRG: 323 | End: 2025-01-24
Attending: INTERNAL MEDICINE
Payer: MEDICARE

## 2025-01-24 VITALS
BODY MASS INDEX: 30.03 KG/M2 | RESPIRATION RATE: 12 BRPM | HEIGHT: 74 IN | TEMPERATURE: 97.8 F | SYSTOLIC BLOOD PRESSURE: 138 MMHG | DIASTOLIC BLOOD PRESSURE: 62 MMHG | HEART RATE: 87 BPM | OXYGEN SATURATION: 96 % | WEIGHT: 234 LBS

## 2025-01-24 PROBLEM — I72.9 PSEUDOANEURYSM (HCC): Status: ACTIVE | Noted: 2025-01-24

## 2025-01-24 LAB
HCT VFR BLD AUTO: 33 % (ref 36.6–50.3)
HGB BLD-MCNC: 10.8 G/DL (ref 12.1–17)

## 2025-01-24 PROCEDURE — 97162 PT EVAL MOD COMPLEX 30 MIN: CPT

## 2025-01-24 PROCEDURE — 6370000000 HC RX 637 (ALT 250 FOR IP): Performed by: PHYSICIAN ASSISTANT

## 2025-01-24 PROCEDURE — 97116 GAIT TRAINING THERAPY: CPT

## 2025-01-24 PROCEDURE — 6370000000 HC RX 637 (ALT 250 FOR IP): Performed by: INTERNAL MEDICINE

## 2025-01-24 PROCEDURE — APPSS30 APP SPLIT SHARED TIME 16-30 MINUTES: Performed by: NURSE PRACTITIONER

## 2025-01-24 PROCEDURE — 85014 HEMATOCRIT: CPT

## 2025-01-24 PROCEDURE — 36415 COLL VENOUS BLD VENIPUNCTURE: CPT

## 2025-01-24 PROCEDURE — 6370000000 HC RX 637 (ALT 250 FOR IP)

## 2025-01-24 PROCEDURE — 85018 HEMOGLOBIN: CPT

## 2025-01-24 PROCEDURE — 93926 LOWER EXTREMITY STUDY: CPT

## 2025-01-24 PROCEDURE — 6370000000 HC RX 637 (ALT 250 FOR IP): Performed by: NURSE PRACTITIONER

## 2025-01-24 RX ORDER — ISOSORBIDE MONONITRATE 30 MG/1
30 TABLET, EXTENDED RELEASE ORAL DAILY
Qty: 30 TABLET | Refills: 3 | Status: SHIPPED | OUTPATIENT
Start: 2025-01-24

## 2025-01-24 RX ORDER — NITROGLYCERIN 0.4 MG/1
0.4 TABLET SUBLINGUAL EVERY 5 MIN PRN
Status: DISCONTINUED | OUTPATIENT
Start: 2025-01-24 | End: 2025-01-24 | Stop reason: HOSPADM

## 2025-01-24 RX ORDER — NITROGLYCERIN 0.4 MG/1
TABLET SUBLINGUAL
Status: COMPLETED
Start: 2025-01-24 | End: 2025-01-24

## 2025-01-24 RX ORDER — ISOSORBIDE MONONITRATE 30 MG/1
30 TABLET, EXTENDED RELEASE ORAL DAILY
Status: DISCONTINUED | OUTPATIENT
Start: 2025-01-24 | End: 2025-01-24 | Stop reason: HOSPADM

## 2025-01-24 RX ADMIN — NITROGLYCERIN 0.4 MG: 0.4 TABLET, ORALLY DISINTEGRATING SUBLINGUAL at 12:50

## 2025-01-24 RX ADMIN — ISOSORBIDE MONONITRATE 30 MG: 30 TABLET, EXTENDED RELEASE ORAL at 15:53

## 2025-01-24 RX ADMIN — APIXABAN 5 MG: 5 TABLET, FILM COATED ORAL at 07:54

## 2025-01-24 RX ADMIN — CLONAZEPAM 0.5 MG: 0.5 TABLET ORAL at 07:53

## 2025-01-24 RX ADMIN — FLUOXETINE HYDROCHLORIDE 20 MG: 20 CAPSULE ORAL at 07:54

## 2025-01-24 RX ADMIN — NITROGLYCERIN 0.4 MG: 0.4 TABLET SUBLINGUAL at 12:50

## 2025-01-24 RX ADMIN — CLOPIDOGREL BISULFATE 75 MG: 75 TABLET ORAL at 07:53

## 2025-01-24 ASSESSMENT — PAIN DESCRIPTION - LOCATION: LOCATION: CHEST

## 2025-01-24 ASSESSMENT — PAIN DESCRIPTION - DESCRIPTORS: DESCRIPTORS: TIGHTNESS

## 2025-01-24 ASSESSMENT — PAIN - FUNCTIONAL ASSESSMENT
PAIN_FUNCTIONAL_ASSESSMENT: NONE - DENIES PAIN
PAIN_FUNCTIONAL_ASSESSMENT: NONE - DENIES PAIN

## 2025-01-24 ASSESSMENT — PAIN DESCRIPTION - ORIENTATION: ORIENTATION: LEFT

## 2025-01-24 ASSESSMENT — PAIN SCALES - GENERAL: PAINLEVEL_OUTOF10: 0

## 2025-01-24 NOTE — PLAN OF CARE
Problem: Physical Therapy - Adult  Goal: By Discharge: Performs mobility at highest level of function for planned discharge setting.  See evaluation for individualized goals.  Description: FUNCTIONAL STATUS PRIOR TO ADMISSION: Patient was independent without use of DME.    HOME SUPPORT PRIOR TO ADMISSION: The patient lived with spouse in 1 level home, 4 stairs to enter with railing. Independent ADL's, (+) drives.    Physical Therapy Goals  Initiated 1/24/2025  1.  Patient will move from supine to sit and sit to supine in bed with supervision/set-up within 7 day(s).    2.  Patient will perform sit to stand with supervision/set-up within 7 day(s).  3.  Patient will transfer from bed to chair and chair to bed with supervision/set-up using the least restrictive device within 7 day(s).  4.  Patient will ambulate with supervision/set-up for 100 feet with the least restrictive device within 7 day(s).   5.  Patient will ascend/descend 4 stairs with 1 handrail(s) with contact guard assist within 7 day(s).   Outcome: Progressing     PHYSICAL THERAPY EVALUATION    Patient: Brett Acuna (84 y.o. male)  Date: 1/24/2025  Primary Diagnosis: Abnormal stress test [R94.39]  Coronary artery disease [I25.10]  CAD (coronary artery disease) [I25.10]  CAD in native artery [I25.10]  Procedure(s) (LRB):  coronary angiography (N/A)  Thrombectomy coronary (N/A)  Intravascular ultrasound (N/A)  Ultrasound guided vascular access (N/A) 3 Days Post-Op   Precautions: Restrictions/Precautions: General Precautions (no ROM or BP restrictions per cardiology NP)                      ASSESSMENT:  Patient is a 84 y.o. male admitted to [facility] on 1/21/25 diagnosed with abdominal stress test. He underwent LHC with ROSSANA 1/21/25 with subsequent intervention for STEMI with mechanical aspiration of thrombectomy LAD 1/21/25. Large ecchymosis right groin with US revealing pseudoaneurysm with patient receiving thrombin injection in IR

## 2025-01-24 NOTE — PROGRESS NOTES
[facility] CARDIOLOGY                    Cardiology Care Note     []Initial Encounter     [x]Follow-up    Patient Name: Brett Acuna - :1940 - MRN:[id number]  Primary Cardiologist:   Consulting Cardiologist: Jesse Lopez MD     Reason for encounter: follow up post PCI    HPI:       Brett Acuna is a 84 y.o. male with PMH significant for Carotid Artery Stenosis, CAD, HLD, HTN, AF/flutter (on AC), 2nd Degree AV Block, Anxiety, who presented for scheduled left heart catheterization with Dr. Lopez. Catheterization revealed Mid LAD occlusion s/p ROSSANA with LEONILA 3 flow. While in recovery, patient developed severe chest pain with bradycardia and hypotension requiring levophed. EKG revealed anterolateral STEMI with reciprocal changes.     He was emergently taken back to cath lab and found to have reduced flow of LAD stent with occlusion of D2 branch requiring thrombectomy.     With improved flow in LAD and diagonal branches, patient chest pain resolved, EKG without ST elevation. Patient taken to ICU for post procedural observation.         Subjective:      Brett Acuna reports none.     Assessment and Plan     CAD s/p PCI to LAD, chest pain and hypotension post procedure.   - LAD wired with xin blue  Mechanical aspiration thrombectomy performed - [place] Cat 5 Rx catheter - 1 pass;  Heparin gtt/Integrilin bolus + gtt  On levophed gtt for hypotension  Post aspiration thrombectomy - LEONILA 3 flow in LAD  D2 - vessel - flow present - ostium/prox - haziness present  Attempted to wire D2 vessel - significant ectopy/NSVT - Amiodarone bolus 150mg given  - plan to stop heparin, when ACT < 170 will pull sheath]  - plavix, will stop asa given need for eliquis   - statin     2. Hx a flutter s/p ablation: int a fib overnight rate ok     D/w dtr at bedside       Home in am      ____________________________________________________________    Cardiac testing  25    ECHO (TTE) LIMITED (PRN 
      [facility] CARDIOLOGY                    Cardiology Care Note     []Initial Encounter     [x]Follow-up    Patient Name: Brett Acuna - :1940 - MRN:[id number]  Primary Cardiologist:   Consulting Cardiologist: Jesse Lopez MD     Reason for encounter: follow up post PCI    HPI:       Brett Acuna is a 84 y.o. male with PMH significant for Carotid Artery Stenosis, CAD, HLD, HTN, AF/flutter (on AC), 2nd Degree AV Block, Anxiety, who presented for scheduled left heart catheterization with Dr. Lopez. Catheterization revealed Mid LAD occlusion s/p ROSSANA with LEONILA 3 flow. While in recovery, patient developed severe chest pain with bradycardia and hypotension requiring levophed. EKG revealed anterolateral STEMI with reciprocal changes.     He was emergently taken back to cath lab and found to have reduced flow of LAD stent with occlusion of D2 branch requiring thrombectomy.     With improved flow in LAD and diagonal branches, patient chest pain resolved, EKG without ST elevation. Patient taken to ICU for post procedural observation.         Subjective:      Brett Acuna reports none. He wants to go home.      Assessment and Plan     CAD s/p PCI to LAD, chest pain and hypotension post procedure.   Heparin gtt/Integrilin bolus + gtt  On levophed gtt for hypotension briefly   Post aspiration thrombectomy - LEONILA 3 flow in LAD  D2 - vessel - flow present - ostium/prox - haziness present  Attempted to wire D2 vessel - significant ectopy/NSVT - Amiodarone bolus 150mg given  - cont plavix , no asa as need eliquis  - statin   - US r groin with pseudoaneurysm, s/p thrombin injection  - repeat US today per Dr Davila OP f/u 7-10 days for repeat US. Will arrange   - repeat H/H stable    2. Hx a flutter s/p ablation:  - intermittent a fib   - eliquis       3 visual hallucination    - CT head ok   - resolved after resumption of klonipin         Home today if groin US ok , requesting follow up with  
  Physician Progress Note      PATIENT:               CASEY VELASQUEZ  CSN #:                  [id number]  :                       1940  ADMIT DATE:       2025 11:18 AM  DISCH DATE:  RESPONDING  PROVIDER #:        Sheree Perez NP          QUERY TEXT:    Patient admitted with CAD for [facility], noted to have atrial fibrillation and is   maintained on Eliquis. If possible, please document in progress notes and   discharge summary if you are evaluating and/or treating any of the following:?  ?  The medical record reflects the following:  Risk Factors: HTN, 84-year-old male  Clinical Indicators: admitted for [facility] with urgent cath for mechanical   aspiration thrombectomy of LAD after developing severe CP and hypotension post   initial procedure  - noted to have Afib and maintained on Eliquis  Treatment: Eliquis 5 mg 2 times daily  Options provided:  -- Secondary hypercoagulable state in a patient with atrial fibrillation  -- Other - I will add my own diagnosis  -- Disagree - Not applicable / Not valid  -- Disagree - Clinically unable to determine / Unknown  -- Refer to Clinical Documentation Reviewer    PROVIDER RESPONSE TEXT:    Hx PAF s/p ablation    Resume eliquis this admission    Query created by: Radha Son on 2025 2:05 PM      Electronically signed by:  Sheree Perez NP 2025 1:28 PM          
0200 Have been checking right groin q 2 hours and the site has not changed and hematoma remains the same size. Pedal pulses are palpable and extremities remain warm.   0600 Site assessment no changes.  
0730: SBAR report rec'd from NOLAN Wright. Assumed care of patient at this time. Assessed R groin site and R radial site with offgoing RN. Una outlining both sites where bruising/hematoma has formed/spread. Slight bruising outside of marked areas but per night shift RN, sites have not changed. Will continue to monitor. BLE pedal and DP pulses palpable, R radial pulse palpable. All extremities warm and dry. Sensation intact.     1045: Vascular at bedside for doppler study.     1105: Vascular tech stated patient still has pseudoaneurysm based on study. Information relayed to cardiology NP via perfect serve.     1231: Patient reported \"chest tightness\" for \"about 15 seconds\" during the vascular study, just relayed the information at this time during reassessment. No other symptoms noted. Patient now comfortable.     1243: Patient calling out via call bell. Upon entering, patient stated he has developed the \"chest tightness\" again. No SOB or vital sign changes. Information relayed to cardiology NP via perfect serve. Orders rec'd for sublingual nitro, EKG, and strict bed rest.     1320: Cardiology at bedside. No concerns with chest pain unless it is continuous. MD stating to patient \"fleeting chest pains are not your heart.\"     1615: Discharge instructions reviewed with patient, patient's spouse, and patient's daughters at bedside. All questions answered. All belongings returned to patient at this time. Patient wheeled out of hospital to private vehicle by hospital staff.   
11:31 AM  Patient brought back from waiting area. ID band and allergies confirmed. Consents signed.     3:33 PM  TRANSFER - IN REPORT:    Verbal report received from Blanquita on Brett Acuna  being received from Cath Lab for ordered procedure      Report consisted of patient's Situation, Background, Assessment and   Recommendations(SBAR).     Information from the following report(s) Nurse Handoff Report was reviewed with the receiving nurse.    Opportunity for questions and clarification was provided.      Assessment completed upon patient's arrival to unit and care assumed.      3:42 PM  EKG post pci done by this nurse. Immediate result of EKG showed ST elevation with \"STEMI\" warning. MD notified and shown EKG. Patient taken back to cath table to check for reocclusion. See full MD note for procedure details.   
1300:pt on bedrest per dr Lopez . Pt and his daughter (at bedside)made aware and agreed with the plan .  
1920 Called Cardiology and left message with service that CT of the head was negative. Dr. Christy called back and stated that they would look through the patient chart and call back is they want to add anything else.  0600 Paged  with AM labs and to let her know that the bruising to right groin has increased throughout the night and was marked. It remains soft and the patient is not having any pain. Charge nurse to bedside to visualize the sight.  Reviewed CBC and BMP results. Per MD Sheree NP will assess this AM.  0800 Spoke with IBETH Bentley and updated NP on events of the night and increased groin area bruising.  
ACT results 147. As results under are 170 sheath removed. Pressure held.   
Called by nursing to clarify medications.  Chart reviewed PCI to the LAD, complicated by ISR.    A-fib on Eliquis, Eliquis is on hold.    Had been on Integrilin - this is off.    Received clopidogrel today.  Will order aspirin for tomorrow, however if Eliquis is restarted would not need aspirin-would continue on Eliquis and clopidogrel.    Nursing conveyed home medications of clonazepam and an amoxipine, which I ordered.    Given what seems to be delusions as described as seeing people in cars on the wall above and right to the television, head CT was performed which was negative for acute abnormalities.  Nursing reports no neuro deficits.    Defer to primary team for further clarification in the morning.  
In recovery area post cath - pt developed severe CP with hypotension  EKG - Anterior ST elevation noted    SBP in 80's  CVS - S1S2 +; Irr; 2/6 sys murmur+    Pt taken to cath lab immediately    Jesse Lopez. MD, FACC    CC time >35 min  
Patient notes he is seeing people and cars on the wall above and to the right of the television.  Per family onset approx 2 hours ago.     No there neuro deficits    Will ask intensivist to evaluate  CT head w/wo contrast  Updated family at bedside   
Per Dr. Lopez TR band and sheath will stay in until a.m.  
Physical Therapy Note:    PT order received and chart reviewed. Patient is s/p cardiac procedures and subsequently right groin ecchymosis. Right groin US with (+) findings of pseudoaneurysm. Discussed presentation with cardiology NP who states patient needs to have thrombin injection in IR prior to mobilization. Will hold PT evaluation at this time and continue to follow.    Ashley Jean, PT, DPT   
Physical Therapy Note:    Per EMR, patient awaiting repeat right groin US for assessment of pseudoaneurysm s/p thrombin injection in IR yesterday. Per EMR, patient not yet cleared to mobilize until US is completed and reviewed. Will continue to hold PT evaluation at this time awaiting medical clearance.    11:50 update: patient with (+) ongoing pseudoaneurysm on imaging per nursing. RN states she is awaiting update from cardiology. Reviewed with RN PT concerns for BP parameters and/or ROM restrictions if indicated. RN states she will discuss with cardiology. Will continue to hold PT at this time.     Thank you,  Ashley Jean, PT, DPT   
Pt seen in ICU  No CP  Blood pressure (!) 148/71, pulse 93, temperature 98.7 °F (37.1 °C), temperature source Oral, resp. rate 17, height 1.88 m (6' 2\"), weight 106.1 kg (234 lb), SpO2 94%.  CVS - S1 S2 + ; Irr;     Wean off levophed gtt    Jesse Lopez. MD, FACC              
Right LE arterial duplex completed. Preliminary report given to Chris Bentley NP.     
Right pseudoaneurysm study completed. Preliminary report given to Dr. Lopez.   
TRANSFER - OUT REPORT:    Bedside report given to NOLAN Hager.  Pt laying flat in bed post procedure.    
tablet 50 mg, 50 mg, Oral, Nightly, Alayna Christy MD, 50 mg at 01/23/25 0010    acetaminophen (TYLENOL) tablet 650 mg, 650 mg, Oral, Q4H PRN, Jesse Lopez MD, 650 mg at 01/21/25 2347    clopidogrel (PLAVIX) tablet 75 mg, 75 mg, Oral, Daily, Jesse Lopez MD, 75 mg at 01/22/25 1036    atorvastatin (LIPITOR) tablet 40 mg, 40 mg, Oral, Nightly, Jesse Lopez MD, 40 mg at 01/22/25 2139    ondansetron (ZOFRAN) injection 4 mg, 4 mg, IntraVENous, Q6H PRN, Ashley Malave PA, 4 mg at 01/21/25 7212    Sheree Chris, APRN - NP    [facility] Cardiology  Call center: (P) [phone]  (F) [phone]      CC:Peter Solano MD

## 2025-01-24 NOTE — CARE COORDINATION
01/24/25 1507   Condition of Participation: Discharge Planning   The Plan for Transition of Care is related to the following treatment goals: home health,DME   The Patient and/or Patient Representative was provided with a Choice of Provider? Patient   The Patient and/Or Patient Representative agree with the Discharge Plan? Yes  (has home health but cannot recall the name of the agency)   Freedom of Choice list was provided with basic dialogue that supports the patient's individualized plan of care/goals, treatment preferences, and shares the quality data associated with the providers?  Yes     I am contacting pt.'s daughter who is a RN on 4th floor.  Pt actually has never had home health  His wife did   Family has no preferences so referral sent to [facility] and to [employer] with request to deliver RW to pt.'s room prior to discharge today.    Fauzia Marmolejo RN  
[facility] has accepted pt for home health for home PT.  Once [employer] delivers the rolling walker,pt is okay for discharge from a case management perspective.    Fauzia Marmolejo RN  
Care Management Progress Note    Reason for Admission:   Abnormal stress test [R94.39]  Coronary artery disease [I25.10]  CAD (coronary artery disease) [I25.10]  CAD in native artery [I25.10]  Procedure(s) (LRB):  coronary angiography (N/A)  Thrombectomy coronary (N/A)  Intravascular ultrasound (N/A)  Ultrasound guided vascular access (N/A)  2 Days Post-Op    Patient Admission Status: Inpatient  RUR: 10%  Hospitalization in the last 30 days (Readmission):  no        Transition of care plan:  [Medical]  PT to see pt today.  I contacted [facility] pharmacy to activate pt.'s coupon for eliquis.I also gave pt the coupin,  Rx bin [id number]  Grp [phone]  Id [phone]  Rx pcn [phone]  The first month will be zero dollars.  Pt will discuss next steps with cardiologist and next month eliquis will be greater than 500 dollars.  [DC plan]  Potential discharge if hematoma remains stable after working with PT  Discharge plan communicated with patient and/or discharge caregiver: yes    I met with pt and his daughter to discuss plan for discharge and explained the coupon for eliquis and to discuss next month's eliquis with OP cardiologist.  Date 1st IMM letter given: I have recontacted registration regarding need for first medicare letter   Outpatient follow-up.PCP Dr Solano and cardiology  Transport at discharge:  family Fauzia Marmolejo RN  
concerns, please [employer]

## 2025-01-24 NOTE — PLAN OF CARE
Problem: Physical Therapy - Adult  Goal: By Discharge: Performs mobility at highest level of function for planned discharge setting.  See evaluation for individualized goals.  Description: FUNCTIONAL STATUS PRIOR TO ADMISSION: Patient was independent without use of DME.    HOME SUPPORT PRIOR TO ADMISSION: The patient lived with spouse in 1 level home, 4 stairs to enter with railing. Independent ADL's, (+) drives.    Physical Therapy Goals  Initiated 1/24/2025  1.  Patient will move from supine to sit and sit to supine in bed with supervision/set-up within 7 day(s).    2.  Patient will perform sit to stand with supervision/set-up within 7 day(s).  3.  Patient will transfer from bed to chair and chair to bed with supervision/set-up using the least restrictive device within 7 day(s).  4.  Patient will ambulate with supervision/set-up for 100 feet with the least restrictive device within 7 day(s).   5.  Patient will ascend/descend 4 stairs with 1 handrail(s) with contact guard assist within 7 day(s).   1/24/2025 1501 by Ashley Jean, PT  Outcome: Progressing

## 2025-01-25 LAB
ECHO BSA: 2.35 M2
EKG ATRIAL RATE: 96 BPM
EKG DIAGNOSIS: NORMAL
EKG P-R INTERVAL: 208 MS
EKG Q-T INTERVAL: 356 MS
EKG QRS DURATION: 104 MS
EKG QTC CALCULATION (BAZETT): 445 MS
EKG R AXIS: -45 DEGREES
EKG T AXIS: 42 DEGREES
EKG VENTRICULAR RATE: 94 BPM
VAS RIGHT ATA DIST PSV: 95.1 CM/S
VAS RIGHT CFA PROX PSV: 92.9 CM/S
VAS RIGHT POP A DIST PSV: 99 CM/S
VAS RIGHT POP A PROX PSV: 80.8 CM/S
VAS RIGHT POP A PROX VEL RATIO: 0.83
VAS RIGHT PSEUDOANEURYSM AP DIAM: 1.15 CM
VAS RIGHT PSEUDOANEURYSM LENGTH: 1.74 CM
VAS RIGHT PSEUDOANEURYSM TR DIAM: 1.37 CM
VAS RIGHT PSEUDOANEURYSM WIDTH: 0.41 CM
VAS RIGHT SFA DIST PSV: 97.2 CM/S
VAS RIGHT SFA DIST VEL RATIO: 0.97
VAS RIGHT SFA MID PSV: 100.6 CM/S
VAS RIGHT SFA MID VEL RATIO: 0.8
VAS RIGHT SFA PROX PSV: 124.8 CM/S
VAS RIGHT SFA PROX VEL RATIO: 1.3

## 2025-02-03 ENCOUNTER — HOSPITAL ENCOUNTER (OUTPATIENT)
Dept: VASCULAR SURGERY | Facility: HOSPITAL | Age: 85
Discharge: HOME | End: 2025-02-05
Payer: MEDICARE

## 2025-02-03 DIAGNOSIS — I72.9 PSEUDOANEURYSM (HCC): ICD-10-CM

## 2025-02-03 LAB — ACT BLD: 147 SECS (ref 79–138)

## 2025-02-03 PROCEDURE — 93926 LOWER EXTREMITY STUDY: CPT

## 2025-02-04 LAB
VAS RIGHT CFA PROX PSV: 125 CM/S
VAS RIGHT POP A DIST PSV: 87.8 CM/S
VAS RIGHT POP A PROX PSV: 72.3 CM/S
VAS RIGHT POP A PROX VEL RATIO: 1
VAS RIGHT PSEUDOANEURYSM LENGTH: 0.65 CM
VAS RIGHT PSEUDOANEURYSM WIDTH: 0.27 CM
VAS RIGHT SFA DIST PSV: 72.3 CM/S
VAS RIGHT SFA DIST VEL RATIO: 0.63
VAS RIGHT SFA MID PSV: 114.6 CM/S
VAS RIGHT SFA MID VEL RATIO: 0.8
VAS RIGHT SFA PROX PSV: 148.3 CM/S
VAS RIGHT SFA PROX VEL RATIO: 1.2

## 2025-02-21 ENCOUNTER — OFFICE VISIT (OUTPATIENT)
Age: 85
End: 2025-02-21
Payer: MEDICARE

## 2025-02-21 VITALS
SYSTOLIC BLOOD PRESSURE: 128 MMHG | HEIGHT: 74 IN | HEART RATE: 87 BPM | RESPIRATION RATE: 18 BRPM | DIASTOLIC BLOOD PRESSURE: 82 MMHG | WEIGHT: 232 LBS | OXYGEN SATURATION: 96 % | BODY MASS INDEX: 29.77 KG/M2

## 2025-02-21 DIAGNOSIS — I48.0 PAF (PAROXYSMAL ATRIAL FIBRILLATION) (HCC): ICD-10-CM

## 2025-02-21 DIAGNOSIS — E78.2 MIXED HYPERLIPIDEMIA: ICD-10-CM

## 2025-02-21 DIAGNOSIS — Z79.01 CHRONIC ANTICOAGULATION: ICD-10-CM

## 2025-02-21 DIAGNOSIS — I25.10 CORONARY ARTERY DISEASE INVOLVING NATIVE CORONARY ARTERY OF NATIVE HEART WITHOUT ANGINA PECTORIS: Primary | ICD-10-CM

## 2025-02-21 PROCEDURE — 93005 ELECTROCARDIOGRAM TRACING: CPT | Performed by: INTERNAL MEDICINE

## 2025-02-21 PROCEDURE — 99214 OFFICE O/P EST MOD 30 MIN: CPT | Performed by: INTERNAL MEDICINE

## 2025-02-21 NOTE — PROGRESS NOTES
had concerns including Follow-Up from Hospital and Coronary Artery Disease.    Vitals:    02/21/25 0948   BP: 128/82   Site: Left Upper Arm   Position: Sitting   Pulse: 87   Resp: 18   SpO2: 96%   Weight: 105.2 kg (232 lb)   Height: 1.88 m (6' 2\")        Chest pain No    Refills No        1. Have you been to the ER, urgent care clinic since your last visit? No       Hospitalized since your last visit? No       Where?        Date?

## 2025-02-21 NOTE — PROGRESS NOTES
Jesse Lopez MD., [facility]    [address],[facility],[place] [address]    Office [phone],Fax [phone]           Brett Acuna is here for a f/u office visit.    Primary care physician:  Peter Solano MD    CC - as documented in EMR    Dear Peter Main MD    I had the pleasure of seeing Mr.Robert KAROLINA Acuna in the office today.      Assessment:     CAD/Abnormal stress test - PCI to complex LAD lesion 1/21/25 - post cath complicated by Stent thrombosis/closure of branch vessel - requiring repeat cath - mechanical asp thrombectomy with re-establishment of flow- GDMT ;      History of recurrent A-fib during hospitalization January 2025/PAF- has had ablation before (per daughter-at [facility])- Will need to fup ep. Eliquis    HLD      Plan:      Blood pressure controlled  On statin.  Will refer to EP-recurrent A-fib status post ablation.  In sinus rhythm today.  On Eliquis.  Increase activity.  Recommended cardiac rehab but patient prefers to walk at home.  Aggressive cardiovascular risk for modification  Follow-up 4 months with echo prior to visit      Patient understands the plan. All questions were answered to the patient's satisfaction.    I appreciate the opportunity to be involved in . See note below for details. Please do not hesitate to contact us with questions or concerns.    Jesse Lopez MD      Cardiac Testing/ Procedures:       A.Cardiac Cath/PCI: 1/21/2025  Access:   R CFA - Ultrasound/Fluoroscopic guided micropuncture access - 6 F sheath    Catheters: RCA : not engaged                    LCA : EBU 3.75 guide    Findings:     L Main: Large; MLI     LAD: Ostial LAD 50%; Prox/Mid LAD - stent patent but LEONILA 2 flow apcial LAD;  D1 - small ; Ostium/prox 60%; LEONILA 2 flow;  D2 - not visualized       LCflex:Large; Dominant; MLI; OM1/OM2 - large; MLI    RCA: not engaged    LVEDP: Valve not crossed    PCI: EBU 3.75 guide  LAD wired with xin

## 2025-02-24 LAB — ECHO BSA: 2.35 M2

## 2025-02-26 RX ORDER — ISOSORBIDE MONONITRATE 30 MG/1
30 TABLET, EXTENDED RELEASE ORAL DAILY
Qty: 90 TABLET | Refills: 1 | Status: SHIPPED | OUTPATIENT
Start: 2025-02-26

## 2025-05-19 RX ORDER — APIXABAN 5 MG/1
5 TABLET, FILM COATED ORAL 2 TIMES DAILY
Qty: 60 TABLET | Refills: 3 | Status: SHIPPED | OUTPATIENT
Start: 2025-05-19

## 2025-05-19 NOTE — TELEPHONE ENCOUNTER
Requested Prescriptions     Signed Prescriptions Disp Refills    ELIQUIS 5 MG TABS tablet 60 tablet 3     Sig: TAKE 1 TABLET BY MOUTH TWICE A DAY     Authorizing Provider: JAXSON SERRANO     Ordering User: JESSICA ARMANDO

## 2025-05-19 NOTE — TELEPHONE ENCOUNTER
Requested Prescriptions     Signed Prescriptions Disp Refills    ELIQUIS 5 MG TABS tablet 60 tablet 3     Sig: TAKE 1 TABLET BY MOUTH TWICE A DAY     Authorizing Provider: JAXSON SERRANO     Ordering User: JESSICA ARMANDO A     Verbal order for refill per Dr Serrano

## 2025-06-09 ENCOUNTER — ANCILLARY PROCEDURE (OUTPATIENT)
Age: 85
End: 2025-06-09
Payer: MEDICARE

## 2025-06-09 VITALS
BODY MASS INDEX: 28.36 KG/M2 | WEIGHT: 221 LBS | SYSTOLIC BLOOD PRESSURE: 118 MMHG | HEART RATE: 92 BPM | HEIGHT: 74 IN | DIASTOLIC BLOOD PRESSURE: 60 MMHG

## 2025-06-09 DIAGNOSIS — Z79.01 CHRONIC ANTICOAGULATION: ICD-10-CM

## 2025-06-09 DIAGNOSIS — I48.0 PAF (PAROXYSMAL ATRIAL FIBRILLATION) (HCC): ICD-10-CM

## 2025-06-09 DIAGNOSIS — I25.10 CORONARY ARTERY DISEASE INVOLVING NATIVE CORONARY ARTERY OF NATIVE HEART WITHOUT ANGINA PECTORIS: ICD-10-CM

## 2025-06-09 DIAGNOSIS — E78.2 MIXED HYPERLIPIDEMIA: ICD-10-CM

## 2025-06-09 PROCEDURE — 93306 TTE W/DOPPLER COMPLETE: CPT | Performed by: INTERNAL MEDICINE

## 2025-06-10 ENCOUNTER — RESULTS FOLLOW-UP (OUTPATIENT)
Age: 85
End: 2025-06-10

## 2025-06-10 LAB
ECHO AO ASC DIAM: 3.6 CM
ECHO AO ASCENDING AORTA INDEX: 1.59 CM/M2
ECHO AO ROOT DIAM: 3.9 CM
ECHO AO ROOT INDEX: 1.72 CM/M2
ECHO AV AREA PEAK VELOCITY: 3 CM2
ECHO AV AREA VTI: 3.1 CM2
ECHO AV AREA/BSA PEAK VELOCITY: 1.3 CM2/M2
ECHO AV AREA/BSA VTI: 1.4 CM2/M2
ECHO AV MEAN GRADIENT: 8 MMHG
ECHO AV MEAN VELOCITY: 1.3 M/S
ECHO AV PEAK GRADIENT: 13 MMHG
ECHO AV PEAK VELOCITY: 1.8 M/S
ECHO AV VELOCITY RATIO: 0.67
ECHO AV VTI: 26.8 CM
ECHO BSA: 2.29 M2
ECHO EST RA PRESSURE: 3 MMHG
ECHO LA DIAMETER INDEX: 1.76 CM/M2
ECHO LA DIAMETER: 4 CM
ECHO LA TO AORTIC ROOT RATIO: 1.03
ECHO LA VOL A-L A2C: 66 ML (ref 18–58)
ECHO LA VOL A-L A4C: 74 ML (ref 18–58)
ECHO LA VOL MOD A2C: 62 ML (ref 18–58)
ECHO LA VOL MOD A4C: 71 ML (ref 18–58)
ECHO LA VOLUME AREA LENGTH: 73 ML
ECHO LA VOLUME INDEX A-L A2C: 29 ML/M2 (ref 16–34)
ECHO LA VOLUME INDEX A-L A4C: 33 ML/M2 (ref 16–34)
ECHO LA VOLUME INDEX AREA LENGTH: 32 ML/M2 (ref 16–34)
ECHO LA VOLUME INDEX MOD A2C: 27 ML/M2 (ref 16–34)
ECHO LA VOLUME INDEX MOD A4C: 31 ML/M2 (ref 16–34)
ECHO LV E' LATERAL VELOCITY: 4.04 CM/S
ECHO LV E' SEPTAL VELOCITY: 3.98 CM/S
ECHO LV EDV A2C: 64 ML
ECHO LV EDV A4C: 81 ML
ECHO LV EDV BP: 75 ML (ref 67–155)
ECHO LV EDV INDEX A4C: 36 ML/M2
ECHO LV EDV INDEX BP: 33 ML/M2
ECHO LV EDV NDEX A2C: 28 ML/M2
ECHO LV EF PHYSICIAN: 55 %
ECHO LV EJECTION FRACTION A2C: 62 %
ECHO LV EJECTION FRACTION A4C: 62 %
ECHO LV EJECTION FRACTION BIPLANE: 63 % (ref 55–100)
ECHO LV ESV A2C: 25 ML
ECHO LV ESV A4C: 31 ML
ECHO LV ESV BP: 27 ML (ref 22–58)
ECHO LV ESV INDEX A2C: 11 ML/M2
ECHO LV ESV INDEX A4C: 14 ML/M2
ECHO LV ESV INDEX BP: 12 ML/M2
ECHO LV FRACTIONAL SHORTENING: 34 % (ref 28–44)
ECHO LV INTERNAL DIMENSION DIASTOLE INDEX: 1.94 CM/M2
ECHO LV INTERNAL DIMENSION DIASTOLIC: 4.4 CM (ref 4.2–5.9)
ECHO LV INTERNAL DIMENSION SYSTOLIC INDEX: 1.28 CM/M2
ECHO LV INTERNAL DIMENSION SYSTOLIC: 2.9 CM
ECHO LV IVSD: 1 CM (ref 0.6–1)
ECHO LV MASS 2D: 147.8 G (ref 88–224)
ECHO LV MASS INDEX 2D: 65.1 G/M2 (ref 49–115)
ECHO LV POSTERIOR WALL DIASTOLIC: 1 CM (ref 0.6–1)
ECHO LV RELATIVE WALL THICKNESS RATIO: 0.45
ECHO LVOT AREA: 4.5 CM2
ECHO LVOT AV VTI INDEX: 0.67
ECHO LVOT DIAM: 2.4 CM
ECHO LVOT MEAN GRADIENT: 3 MMHG
ECHO LVOT PEAK GRADIENT: 6 MMHG
ECHO LVOT PEAK VELOCITY: 1.2 M/S
ECHO LVOT STROKE VOLUME INDEX: 35.9 ML/M2
ECHO LVOT SV: 81.4 ML
ECHO LVOT VTI: 18 CM
ECHO MV A VELOCITY: 1.01 M/S
ECHO MV AREA PHT: 1.9 CM2
ECHO MV AREA VTI: 3.3 CM2
ECHO MV E DECELERATION TIME (DT): 398.5 MS
ECHO MV E VELOCITY: 0.61 M/S
ECHO MV E/A RATIO: 0.6
ECHO MV E/E' LATERAL: 15.1
ECHO MV E/E' RATIO (AVERAGED): 15.21
ECHO MV E/E' SEPTAL: 15.33
ECHO MV LVOT VTI INDEX: 1.38
ECHO MV MAX VELOCITY: 1.2 M/S
ECHO MV MEAN GRADIENT: 2 MMHG
ECHO MV MEAN VELOCITY: 0.7 M/S
ECHO MV PEAK GRADIENT: 5 MMHG
ECHO MV PRESSURE HALF TIME (PHT): 115.6 MS
ECHO MV VTI: 24.9 CM
ECHO RA AREA 4C: 18.5 CM2
ECHO RA END SYSTOLIC VOLUME APICAL 4 CHAMBER INDEX BSA: 22 ML/M2
ECHO RA VOLUME: 49 ML
ECHO RIGHT VENTRICULAR SYSTOLIC PRESSURE (RVSP): 25 MMHG
ECHO RV INTERNAL DIMENSION: 3.8 CM
ECHO RV TAPSE: 1.8 CM (ref 1.7–?)
ECHO TV REGURGITANT MAX VELOCITY: 2.37 M/S
ECHO TV REGURGITANT PEAK GRADIENT: 23 MMHG

## 2025-06-10 PROCEDURE — 93306 TTE W/DOPPLER COMPLETE: CPT | Performed by: INTERNAL MEDICINE

## 2025-06-18 ENCOUNTER — OFFICE VISIT (OUTPATIENT)
Age: 85
End: 2025-06-18
Payer: MEDICARE

## 2025-06-18 ENCOUNTER — TELEPHONE (OUTPATIENT)
Age: 85
End: 2025-06-18

## 2025-06-18 VITALS
RESPIRATION RATE: 16 BRPM | BODY MASS INDEX: 28.88 KG/M2 | HEART RATE: 96 BPM | DIASTOLIC BLOOD PRESSURE: 60 MMHG | SYSTOLIC BLOOD PRESSURE: 110 MMHG | HEIGHT: 74 IN | WEIGHT: 225 LBS

## 2025-06-18 DIAGNOSIS — Z79.01 CHRONIC ANTICOAGULATION: ICD-10-CM

## 2025-06-18 DIAGNOSIS — I49.9 IRREGULAR HEART BEAT: Primary | ICD-10-CM

## 2025-06-18 DIAGNOSIS — I48.0 PAROXYSMAL ATRIAL FIBRILLATION (HCC): ICD-10-CM

## 2025-06-18 DIAGNOSIS — I25.10 CORONARY ARTERY DISEASE INVOLVING NATIVE CORONARY ARTERY OF NATIVE HEART WITHOUT ANGINA PECTORIS: ICD-10-CM

## 2025-06-18 PROCEDURE — 1036F TOBACCO NON-USER: CPT | Performed by: HOSPITALIST

## 2025-06-18 PROCEDURE — 93010 ELECTROCARDIOGRAM REPORT: CPT | Performed by: HOSPITALIST

## 2025-06-18 PROCEDURE — 1159F MED LIST DOCD IN RCRD: CPT | Performed by: HOSPITALIST

## 2025-06-18 PROCEDURE — G8419 CALC BMI OUT NRM PARAM NOF/U: HCPCS | Performed by: HOSPITALIST

## 2025-06-18 PROCEDURE — 1125F AMNT PAIN NOTED PAIN PRSNT: CPT | Performed by: HOSPITALIST

## 2025-06-18 PROCEDURE — 99204 OFFICE O/P NEW MOD 45 MIN: CPT | Performed by: HOSPITALIST

## 2025-06-18 PROCEDURE — G8427 DOCREV CUR MEDS BY ELIG CLIN: HCPCS | Performed by: HOSPITALIST

## 2025-06-18 PROCEDURE — 93005 ELECTROCARDIOGRAM TRACING: CPT | Performed by: HOSPITALIST

## 2025-06-18 PROCEDURE — 1123F ACP DISCUSS/DSCN MKR DOCD: CPT | Performed by: HOSPITALIST

## 2025-06-18 NOTE — PROGRESS NOTES
Cardiac Electrophysiology OFFICE Consultation Note     Subjective:      Brett Acuna is a pleasant 84 y.o. male.  He is a resident of [place] [address].    Primary Cardiologist: Jesse Lopez MD    He is retired. He lives with his wife    Brett Aucna presents to electrophysiology clinic for management of Atrial Arrythmias.    His current medical conditions and PMH are detailed below.    Today's visit:  Date: 6/18/2025  He is here for establishing care with EP.  He had prior history of atrial fibrillation and underwent ablation at [facility] in 2024.  He reports that he has been doing okay overall and denies any palpitations, dizziness or lightheadedness recently.  Rest as below.      Cardiac Rhythm Testing     All EKGs were personally interpreted by me as below    EKG from 6/18/2025 shows sinus rhythm, first-degree AV delay.  Left intrafascicular block.  1 dropped beat likely Wenckebach AV block          Assessment:       ICD-10-CM    1. Irregular heart beat  I49.9 EKG 12 Lead      2. Paroxysmal atrial fibrillation (HCC)  I48.0       3. Coronary artery disease involving native coronary artery of native heart without angina pectoris  I25.10       4. Chronic anticoagulation  Z79.01           # Paroxysmal Atrial Fibrillation s/p prior ablation  Onset: ~2023/2024  Symptoms: fluttering, chest discomfort  Alcohol: n/a  ALESSANDRA: not evaluated  Prior AAD: none  Current Meds/AAD: none  Prior Cardioversion: prior to ablation April 2024  Ablation: prior ablation at [facility] roughly May 29, 2024  LA size: Normal size on echo June 25  -- TTE June 2025: LVEF 55-60%.  Grade 1 diastolic dysfunction.    -- He reportedly had atrial fibrillation during hospitalization in Jan 25.  I reviewed prior EKGs and one of them which was reported as A-fib which I think is brief atrial tachycardia and then sinus beats.  No clear evidence of atrial fibrillation on the EKG.  It is possible he was having atrial fibrillation on

## 2025-06-18 NOTE — PROGRESS NOTES
Chief Complaint   Patient presents with    New Patient    Atrial Fibrillation     No recent ER or hospital visits    Has shortness of breath - on exertion    No recent chest pain or dizziness

## 2025-06-18 NOTE — TELEPHONE ENCOUNTER
Enrolled with [employer] - Ordered and being shipped to patient's home address on file.  ETA within 5-7 Business Days.        week onitor  Received: Today  Emelyn Lynch, Debbie Caban; Magdalena Peralta  Hi!    Can you pls order patient a 14 day monitor for tracee? DX afib

## 2025-06-27 ENCOUNTER — OFFICE VISIT (OUTPATIENT)
Age: 85
End: 2025-06-27
Payer: MEDICARE

## 2025-06-27 VITALS
DIASTOLIC BLOOD PRESSURE: 64 MMHG | SYSTOLIC BLOOD PRESSURE: 110 MMHG | OXYGEN SATURATION: 96 % | BODY MASS INDEX: 28.62 KG/M2 | WEIGHT: 223 LBS | HEIGHT: 74 IN | HEART RATE: 80 BPM

## 2025-06-27 DIAGNOSIS — I48.0 PAF (PAROXYSMAL ATRIAL FIBRILLATION) (HCC): ICD-10-CM

## 2025-06-27 DIAGNOSIS — Z79.01 CHRONIC ANTICOAGULATION: ICD-10-CM

## 2025-06-27 DIAGNOSIS — I25.10 CORONARY ARTERY DISEASE INVOLVING NATIVE CORONARY ARTERY OF NATIVE HEART WITHOUT ANGINA PECTORIS: Primary | ICD-10-CM

## 2025-06-27 DIAGNOSIS — E78.2 MIXED HYPERLIPIDEMIA: ICD-10-CM

## 2025-06-27 PROCEDURE — G2211 COMPLEX E/M VISIT ADD ON: HCPCS | Performed by: INTERNAL MEDICINE

## 2025-06-27 PROCEDURE — 99214 OFFICE O/P EST MOD 30 MIN: CPT | Performed by: INTERNAL MEDICINE

## 2025-06-27 PROCEDURE — 1159F MED LIST DOCD IN RCRD: CPT | Performed by: INTERNAL MEDICINE

## 2025-06-27 PROCEDURE — G8427 DOCREV CUR MEDS BY ELIG CLIN: HCPCS | Performed by: INTERNAL MEDICINE

## 2025-06-27 PROCEDURE — 1123F ACP DISCUSS/DSCN MKR DOCD: CPT | Performed by: INTERNAL MEDICINE

## 2025-06-27 PROCEDURE — 1126F AMNT PAIN NOTED NONE PRSNT: CPT | Performed by: INTERNAL MEDICINE

## 2025-06-27 PROCEDURE — 1036F TOBACCO NON-USER: CPT | Performed by: INTERNAL MEDICINE

## 2025-06-27 PROCEDURE — G8419 CALC BMI OUT NRM PARAM NOF/U: HCPCS | Performed by: INTERNAL MEDICINE

## 2025-06-27 NOTE — PROGRESS NOTES
Chief Complaint   Patient presents with    Coronary Artery Disease    Atrial Fibrillation     Hx of     Hyperlipidemia     Vitals:    06/27/25 1134   BP: 110/64   BP Site: Left Upper Arm   Patient Position: Sitting   Pulse: 80   SpO2: 96%   Weight: 101.2 kg (223 lb)   Height: 1.88 m (6' 2\")         Chest pain: DENIED     Recent hospital stays: DENIED     Refills: DENIED

## 2025-06-27 NOTE — PROGRESS NOTES
Jesse Lopez MD., Located within [facility]    [address],[facility],[place] [address]    Office [phone],Fax [phone]           Brett Acuna is here for a f/u office visit.    Primary care physician:  John Byrd DO    CC - as documented in EMR    Dear John Ramirez,     I had the pleasure of seeing Mr.Robert KAROLINA Acuna in the office today.      Assessment:     CAD/Abnormal stress test - PCI to complex LAD lesion 1/21/25 - post cath complicated by Stent thrombosis/closure of branch vessel - requiring repeat cath - mechanical asp thrombectomy with re-establishment of flow- GDMT ;      PAF-status post ablation-history of recurrent A-fib during hospitalization January 2025/PAF- has had ablation before (per daughter-at  [facility])-  Eliquis.  Seen by Dr. Rosario 6/18/2025.  Wearing 2-week event monitor.    HLD      Plan:      Blood pressure controlled  On statin.  On Plavix/Eliquis.  Discussed about switching to aspirin/Eliquis.  Patient prefers to be on Plavix since he has not had any issues with bleeding.  Aggressive cardiovascular risk for modification  Follow-up 6 months/earlier as needed      Patient understands the plan. All questions were answered to the patient's satisfaction.    I appreciate the opportunity to be involved in . See note below for details. Please do not hesitate to contact us with questions or concerns.    Jesse Lopez MD      Cardiac Testing/ Procedures:       A.Cardiac Cath/PCI: 1/21/2025  Access:   R CFA - Ultrasound/Fluoroscopic guided micropuncture access - 6 F sheath    Catheters: RCA : not engaged                    LCA : EBU 3.75 guide    Findings:     L Main: Large; MLI     LAD: Ostial LAD 50%; Prox/Mid LAD - stent patent but LEONILA 2 flow apcial LAD;  D1 - small ; Ostium/prox 60%; LEONILA 2 flow;  D2 - not visualized       LCflex:Large; Dominant; MLI; OM1/OM2 - large; MLI    RCA: not engaged    LVEDP: Valve not crossed    PCI: EBU 3.75

## 2025-07-29 ENCOUNTER — RESULTS FOLLOW-UP (OUTPATIENT)
Age: 85
End: 2025-07-29

## 2025-07-29 NOTE — RESULT ENCOUNTER NOTE
Your heart rhythm monitor did not show any clear evidence of atrial fibrillation.  You did have a lot of extra heartbeats and short runs of atrial tachycardia which can be precursors for atrial fibrillation.    Given the high burden of extra heartbeats and runs of atrial tachycardia, I recommend to start a beta-blocker to help with this.    -- Recommend to start metoprolol succinate 12.5 mg once daily.

## 2025-07-30 ENCOUNTER — PATIENT MESSAGE (OUTPATIENT)
Age: 85
End: 2025-07-30

## 2025-07-30 RX ORDER — METOPROLOL SUCCINATE 25 MG/1
12.5 TABLET, EXTENDED RELEASE ORAL DAILY
Qty: 45 TABLET | Refills: 1 | Status: SHIPPED | OUTPATIENT
Start: 2025-07-30

## (undated) DEVICE — GLIDESHEATH SLENDER ACCESS KIT: Brand: GLIDESHEATH SLENDER

## (undated) DEVICE — HI-TORQUE VERSACORE MODIFIED J GUIDE WIRE SYSTEM 145 CM: Brand: HI-TORQUE VERSACORE

## (undated) DEVICE — TUBING PRSS MON L6IN PVC M FEM CONN

## (undated) DEVICE — Device: Brand: EAGLE EYE PLATINUM RX DIGITAL IVUS CATHETER

## (undated) DEVICE — PINNACLE PRECISION ACCESS SYSTEM INTRODUCER SHEATH: Brand: PINNACLE PRECISION ACCESS SYSTEM

## (undated) DEVICE — CATHETER DIAG 5FR L100CM LUMN ID0.047IN JR4 CRV 0 SIDE H

## (undated) DEVICE — SUPPORT WRST AD W3.5XL9IN DIA14.5IN ART SFT ADJ HK AND LOOP

## (undated) DEVICE — ROSEN CURVED WIRE GUIDE: Brand: ROSEN

## (undated) DEVICE — SUTURE PERMAHAND SZ 2-0 L18IN NONABSORBABLE BLK L26MM PS 1588H

## (undated) DEVICE — Device: Brand: ASAHI SION BLUE

## (undated) DEVICE — BAND COMPR L29CM XLN CLR PLAS HEMSTAT EXT HK AND LOOP RETEN

## (undated) DEVICE — RUNTHROUGH NS EXTRA FLOPPY PTCA GUIDEWIRE: Brand: RUNTHROUGH

## (undated) DEVICE — CATHETER DIAG 5FR L100CM LUMN ID0.047IN JL4 CRV 0 SIDE H

## (undated) DEVICE — COPILOT BLEEDBACK CONTROL VALVE: Brand: COPILOT

## (undated) DEVICE — MEDI-TRACE CADENCE ADULT, DEFIBRILLATION ELECTRODE -RTS  (10 PR/PK) - PHYSIO-CONTROL: Brand: MEDI-TRACE CADENCE

## (undated) DEVICE — CATHETER IVL C2PLUS SHOCKWAVE 3.0MM X 12MM

## (undated) DEVICE — HEART CATH-SFMC: Brand: MEDLINE INDUSTRIES, INC.

## (undated) DEVICE — DEVICE INFL 20ML 30ATM DGT FLD DISPNS SYR W ACCESSPLUS BLU

## (undated) DEVICE — CATHETER GUID 6FR L100CM DIA0.071IN NYL SHFT EBU3.5

## (undated) DEVICE — VALVE ANGIO ID0.11IN HEMSTAT MTL GUID WIRE INSRT TOOL AND

## (undated) DEVICE — CATHETER THROMCTMY INDIGO CAT PENUMBRA 140CM RX L LUMN ASPIR TBNG

## (undated) DEVICE — CANISTER SUCTION VAC PORTABLE 1000 CC FOR INDIGO SYS ENGINE

## (undated) DEVICE — PRESSURE MONITORING SET: Brand: TRUWAVE

## (undated) DEVICE — Device

## (undated) DEVICE — CATH BLLN ANGIO 2.25X15MM SC EUPHORA RX

## (undated) DEVICE — GUIDEWIRE VASC L180CM DIA0.035IN 3MM PTFE J TIP EXCHG FIX

## (undated) DEVICE — CATHETER GUID 6FR GWIRE 0.071IN COR EXTRA BKUP SUPP 3.75

## (undated) DEVICE — BAND COMPR L24CM REG CLR PLAS HEMSTAT EXT HK AND LOOP RETEN

## (undated) DEVICE — COVER US PRB W15XL120CM W/ GEL RUBBERBAND TAPE STRP FLD GEN